# Patient Record
Sex: MALE | Race: WHITE | NOT HISPANIC OR LATINO | Employment: OTHER | URBAN - METROPOLITAN AREA
[De-identification: names, ages, dates, MRNs, and addresses within clinical notes are randomized per-mention and may not be internally consistent; named-entity substitution may affect disease eponyms.]

---

## 2017-06-27 ENCOUNTER — HOSPITAL ENCOUNTER (INPATIENT)
Facility: MEDICAL CENTER | Age: 57
LOS: 1 days | DRG: 247 | End: 2017-06-28
Attending: EMERGENCY MEDICINE | Admitting: INTERNAL MEDICINE
Payer: COMMERCIAL

## 2017-06-27 ENCOUNTER — RESOLUTE PROFESSIONAL BILLING HOSPITAL PROF FEE (OUTPATIENT)
Dept: HOSPITALIST | Facility: MEDICAL CENTER | Age: 57
End: 2017-06-27
Payer: COMMERCIAL

## 2017-06-27 ENCOUNTER — APPOINTMENT (OUTPATIENT)
Dept: RADIOLOGY | Facility: MEDICAL CENTER | Age: 57
DRG: 247 | End: 2017-06-27
Attending: EMERGENCY MEDICINE
Payer: COMMERCIAL

## 2017-06-27 DIAGNOSIS — I21.4 NON-STEMI (NON-ST ELEVATED MYOCARDIAL INFARCTION) (HCC): ICD-10-CM

## 2017-06-27 DIAGNOSIS — I20.0 UNSTABLE ANGINA (HCC): ICD-10-CM

## 2017-06-27 PROBLEM — I10 ESSENTIAL HYPERTENSION: Status: ACTIVE | Noted: 2017-06-27

## 2017-06-27 LAB
ALBUMIN SERPL BCP-MCNC: 4.2 G/DL (ref 3.2–4.9)
ALBUMIN/GLOB SERPL: 1.5 G/DL
ALP SERPL-CCNC: 68 U/L (ref 30–99)
ALT SERPL-CCNC: 19 U/L (ref 2–50)
ANION GAP SERPL CALC-SCNC: 8 MMOL/L (ref 0–11.9)
APTT PPP: 30 SEC (ref 24.7–36)
AST SERPL-CCNC: 18 U/L (ref 12–45)
BASOPHILS # BLD AUTO: 0.4 % (ref 0–1.8)
BASOPHILS # BLD: 0.03 K/UL (ref 0–0.12)
BILIRUB SERPL-MCNC: 0.5 MG/DL (ref 0.1–1.5)
BNP SERPL-MCNC: 38 PG/ML (ref 0–100)
BUN SERPL-MCNC: 23 MG/DL (ref 8–22)
CALCIUM SERPL-MCNC: 9.8 MG/DL (ref 8.5–10.5)
CHLORIDE SERPL-SCNC: 104 MMOL/L (ref 96–112)
CO2 SERPL-SCNC: 25 MMOL/L (ref 20–33)
CREAT SERPL-MCNC: 1.22 MG/DL (ref 0.5–1.4)
EKG IMPRESSION: NORMAL
EOSINOPHIL # BLD AUTO: 0.18 K/UL (ref 0–0.51)
EOSINOPHIL NFR BLD: 2.5 % (ref 0–6.9)
ERYTHROCYTE [DISTWIDTH] IN BLOOD BY AUTOMATED COUNT: 42.1 FL (ref 35.9–50)
GFR SERPL CREATININE-BSD FRML MDRD: >60 ML/MIN/1.73 M 2
GLOBULIN SER CALC-MCNC: 2.8 G/DL (ref 1.9–3.5)
GLUCOSE SERPL-MCNC: 99 MG/DL (ref 65–99)
HCT VFR BLD AUTO: 46.8 % (ref 42–52)
HGB BLD-MCNC: 15.5 G/DL (ref 14–18)
IMM GRANULOCYTES # BLD AUTO: 0.02 K/UL (ref 0–0.11)
IMM GRANULOCYTES NFR BLD AUTO: 0.3 % (ref 0–0.9)
INR PPP: 0.93 (ref 0.87–1.13)
LIPASE SERPL-CCNC: 19 U/L (ref 11–82)
LYMPHOCYTES # BLD AUTO: 1.92 K/UL (ref 1–4.8)
LYMPHOCYTES NFR BLD: 27.1 % (ref 22–41)
MCH RBC QN AUTO: 28.5 PG (ref 27–33)
MCHC RBC AUTO-ENTMCNC: 33.1 G/DL (ref 33.7–35.3)
MCV RBC AUTO: 86.2 FL (ref 81.4–97.8)
MONOCYTES # BLD AUTO: 0.62 K/UL (ref 0–0.85)
MONOCYTES NFR BLD AUTO: 8.8 % (ref 0–13.4)
NEUTROPHILS # BLD AUTO: 4.31 K/UL (ref 1.82–7.42)
NEUTROPHILS NFR BLD: 60.9 % (ref 44–72)
NRBC # BLD AUTO: 0 K/UL
NRBC BLD AUTO-RTO: 0 /100 WBC
PLATELET # BLD AUTO: 226 K/UL (ref 164–446)
PMV BLD AUTO: 9.7 FL (ref 9–12.9)
POTASSIUM SERPL-SCNC: 4.4 MMOL/L (ref 3.6–5.5)
PROT SERPL-MCNC: 7 G/DL (ref 6–8.2)
PROTHROMBIN TIME: 12.7 SEC (ref 12–14.6)
RBC # BLD AUTO: 5.43 M/UL (ref 4.7–6.1)
SODIUM SERPL-SCNC: 137 MMOL/L (ref 135–145)
TROPONIN I SERPL-MCNC: 0.28 NG/ML (ref 0–0.04)
TROPONIN I SERPL-MCNC: 0.28 NG/ML (ref 0–0.04)
WBC # BLD AUTO: 7.1 K/UL (ref 4.8–10.8)

## 2017-06-27 PROCEDURE — 700101 HCHG RX REV CODE 250

## 2017-06-27 PROCEDURE — 4A023N7 MEASUREMENT OF CARDIAC SAMPLING AND PRESSURE, LEFT HEART, PERCUTANEOUS APPROACH: ICD-10-PCS | Performed by: INTERNAL MEDICINE

## 2017-06-27 PROCEDURE — 93571 IV DOP VEL&/PRESS C FLO 1ST: CPT

## 2017-06-27 PROCEDURE — 700102 HCHG RX REV CODE 250 W/ 637 OVERRIDE(OP)

## 2017-06-27 PROCEDURE — 93005 ELECTROCARDIOGRAM TRACING: CPT | Performed by: INTERNAL MEDICINE

## 2017-06-27 PROCEDURE — 93306 TTE W/DOPPLER COMPLETE: CPT | Mod: 26 | Performed by: INTERNAL MEDICINE

## 2017-06-27 PROCEDURE — C1769 GUIDE WIRE: HCPCS

## 2017-06-27 PROCEDURE — 360979 HCHG DIAGNOSTIC CATH

## 2017-06-27 PROCEDURE — 93010 ELECTROCARDIOGRAM REPORT: CPT | Performed by: INTERNAL MEDICINE

## 2017-06-27 PROCEDURE — C1887 CATHETER, GUIDING: HCPCS

## 2017-06-27 PROCEDURE — 700102 HCHG RX REV CODE 250 W/ 637 OVERRIDE(OP): Performed by: INTERNAL MEDICINE

## 2017-06-27 PROCEDURE — 700111 HCHG RX REV CODE 636 W/ 250 OVERRIDE (IP)

## 2017-06-27 PROCEDURE — C9600 PERC DRUG-EL COR STENT SING: HCPCS | Mod: RC

## 2017-06-27 PROCEDURE — 700111 HCHG RX REV CODE 636 W/ 250 OVERRIDE (IP): Performed by: INTERNAL MEDICINE

## 2017-06-27 PROCEDURE — 80053 COMPREHEN METABOLIC PANEL: CPT

## 2017-06-27 PROCEDURE — 71010 DX-CHEST-PORTABLE (1 VIEW): CPT

## 2017-06-27 PROCEDURE — 700105 HCHG RX REV CODE 258: Performed by: INTERNAL MEDICINE

## 2017-06-27 PROCEDURE — 85025 COMPLETE CBC W/AUTO DIFF WBC: CPT

## 2017-06-27 PROCEDURE — 93458 L HRT ARTERY/VENTRICLE ANGIO: CPT

## 2017-06-27 PROCEDURE — 83880 ASSAY OF NATRIURETIC PEPTIDE: CPT

## 2017-06-27 PROCEDURE — 770020 HCHG ROOM/CARE - TELE (206)

## 2017-06-27 PROCEDURE — C1894 INTRO/SHEATH, NON-LASER: HCPCS

## 2017-06-27 PROCEDURE — C9600 PERC DRUG-EL COR STENT SING: HCPCS | Mod: LD

## 2017-06-27 PROCEDURE — A9270 NON-COVERED ITEM OR SERVICE: HCPCS | Performed by: INTERNAL MEDICINE

## 2017-06-27 PROCEDURE — B2151ZZ FLUOROSCOPY OF LEFT HEART USING LOW OSMOLAR CONTRAST: ICD-10-PCS | Performed by: INTERNAL MEDICINE

## 2017-06-27 PROCEDURE — 96375 TX/PRO/DX INJ NEW DRUG ADDON: CPT

## 2017-06-27 PROCEDURE — 96365 THER/PROPH/DIAG IV INF INIT: CPT

## 2017-06-27 PROCEDURE — 700102 HCHG RX REV CODE 250 W/ 637 OVERRIDE(OP): Performed by: EMERGENCY MEDICINE

## 2017-06-27 PROCEDURE — 93005 ELECTROCARDIOGRAM TRACING: CPT

## 2017-06-27 PROCEDURE — 85610 PROTHROMBIN TIME: CPT

## 2017-06-27 PROCEDURE — 700117 HCHG RX CONTRAST REV CODE 255: Performed by: INTERNAL MEDICINE

## 2017-06-27 PROCEDURE — 93005 ELECTROCARDIOGRAM TRACING: CPT | Performed by: EMERGENCY MEDICINE

## 2017-06-27 PROCEDURE — C1874 STENT, COATED/COV W/DEL SYS: HCPCS

## 2017-06-27 PROCEDURE — 99153 MOD SED SAME PHYS/QHP EA: CPT

## 2017-06-27 PROCEDURE — C1725 CATH, TRANSLUMIN NON-LASER: HCPCS

## 2017-06-27 PROCEDURE — 027135Z DILATION OF CORONARY ARTERY, TWO ARTERIES WITH TWO DRUG-ELUTING INTRALUMINAL DEVICES, PERCUTANEOUS APPROACH: ICD-10-PCS | Performed by: INTERNAL MEDICINE

## 2017-06-27 PROCEDURE — 700101 HCHG RX REV CODE 250: Performed by: EMERGENCY MEDICINE

## 2017-06-27 PROCEDURE — 99285 EMERGENCY DEPT VISIT HI MDM: CPT

## 2017-06-27 PROCEDURE — 99152 MOD SED SAME PHYS/QHP 5/>YRS: CPT

## 2017-06-27 PROCEDURE — B2111ZZ FLUOROSCOPY OF MULTIPLE CORONARY ARTERIES USING LOW OSMOLAR CONTRAST: ICD-10-PCS | Performed by: INTERNAL MEDICINE

## 2017-06-27 PROCEDURE — A9270 NON-COVERED ITEM OR SERVICE: HCPCS | Performed by: EMERGENCY MEDICINE

## 2017-06-27 PROCEDURE — 36415 COLL VENOUS BLD VENIPUNCTURE: CPT

## 2017-06-27 PROCEDURE — 99223 1ST HOSP IP/OBS HIGH 75: CPT | Performed by: INTERNAL MEDICINE

## 2017-06-27 PROCEDURE — 84484 ASSAY OF TROPONIN QUANT: CPT

## 2017-06-27 PROCEDURE — A9270 NON-COVERED ITEM OR SERVICE: HCPCS

## 2017-06-27 PROCEDURE — 4A033BC MEASUREMENT OF ARTERIAL PRESSURE, CORONARY, PERCUTANEOUS APPROACH: ICD-10-PCS | Performed by: INTERNAL MEDICINE

## 2017-06-27 PROCEDURE — 83690 ASSAY OF LIPASE: CPT

## 2017-06-27 PROCEDURE — 93306 TTE W/DOPPLER COMPLETE: CPT

## 2017-06-27 PROCEDURE — 304952 HCHG R 2 PADS

## 2017-06-27 PROCEDURE — 85730 THROMBOPLASTIN TIME PARTIAL: CPT

## 2017-06-27 PROCEDURE — 307093 HCHG TR BAND RADIAL

## 2017-06-27 RX ORDER — ACETAMINOPHEN 325 MG/1
650 TABLET ORAL EVERY 6 HOURS PRN
Status: DISCONTINUED | OUTPATIENT
Start: 2017-06-27 | End: 2017-06-28 | Stop reason: HOSPADM

## 2017-06-27 RX ORDER — LISINOPRIL 10 MG/1
5 TABLET ORAL
Status: DISCONTINUED | OUTPATIENT
Start: 2017-06-27 | End: 2017-06-27

## 2017-06-27 RX ORDER — HEPARIN SODIUM,PORCINE 1000/ML
VIAL (ML) INJECTION
Status: COMPLETED
Start: 2017-06-27 | End: 2017-06-27

## 2017-06-27 RX ORDER — ONDANSETRON 2 MG/ML
4 INJECTION INTRAMUSCULAR; INTRAVENOUS EVERY 6 HOURS PRN
Status: DISCONTINUED | OUTPATIENT
Start: 2017-06-27 | End: 2017-06-27

## 2017-06-27 RX ORDER — ASPIRIN 325 MG
325 TABLET ORAL DAILY
Status: DISCONTINUED | OUTPATIENT
Start: 2017-06-28 | End: 2017-06-27

## 2017-06-27 RX ORDER — ONDANSETRON 2 MG/ML
4 INJECTION INTRAMUSCULAR; INTRAVENOUS EVERY 4 HOURS PRN
Status: DISCONTINUED | OUTPATIENT
Start: 2017-06-27 | End: 2017-06-28 | Stop reason: HOSPADM

## 2017-06-27 RX ORDER — ASPIRIN 81 MG/1
324 TABLET, CHEWABLE ORAL DAILY
Status: DISCONTINUED | OUTPATIENT
Start: 2017-06-28 | End: 2017-06-28 | Stop reason: HOSPADM

## 2017-06-27 RX ORDER — PROMETHAZINE HYDROCHLORIDE 25 MG/1
12.5-25 SUPPOSITORY RECTAL EVERY 4 HOURS PRN
Status: DISCONTINUED | OUTPATIENT
Start: 2017-06-27 | End: 2017-06-28 | Stop reason: HOSPADM

## 2017-06-27 RX ORDER — ADENOSINE 3 MG/ML
INJECTION, SOLUTION INTRAVENOUS
Status: COMPLETED
Start: 2017-06-27 | End: 2017-06-27

## 2017-06-27 RX ORDER — POLYETHYLENE GLYCOL 3350 17 G/17G
1 POWDER, FOR SOLUTION ORAL
Status: DISCONTINUED | OUTPATIENT
Start: 2017-06-27 | End: 2017-06-28 | Stop reason: HOSPADM

## 2017-06-27 RX ORDER — ASPIRIN 325 MG
325 TABLET ORAL DAILY
Status: DISCONTINUED | OUTPATIENT
Start: 2017-06-28 | End: 2017-06-28 | Stop reason: HOSPADM

## 2017-06-27 RX ORDER — MIDAZOLAM HYDROCHLORIDE 1 MG/ML
INJECTION INTRAMUSCULAR; INTRAVENOUS
Status: COMPLETED
Start: 2017-06-27 | End: 2017-06-27

## 2017-06-27 RX ORDER — ATORVASTATIN CALCIUM 20 MG/1
40 TABLET, FILM COATED ORAL EVERY EVENING
Status: DISCONTINUED | OUTPATIENT
Start: 2017-06-27 | End: 2017-06-27

## 2017-06-27 RX ORDER — NITROGLYCERIN 0.4 MG/1
0.4 TABLET SUBLINGUAL
Status: DISCONTINUED | OUTPATIENT
Start: 2017-06-27 | End: 2017-06-28 | Stop reason: HOSPADM

## 2017-06-27 RX ORDER — PRASUGREL 10 MG/1
TABLET, FILM COATED ORAL
Status: COMPLETED
Start: 2017-06-27 | End: 2017-06-27

## 2017-06-27 RX ORDER — ASPIRIN 81 MG/1
324 TABLET, CHEWABLE ORAL DAILY
Status: DISCONTINUED | OUTPATIENT
Start: 2017-06-28 | End: 2017-06-27

## 2017-06-27 RX ORDER — LIDOCAINE HYDROCHLORIDE 20 MG/ML
INJECTION, SOLUTION INFILTRATION; PERINEURAL
Status: COMPLETED
Start: 2017-06-27 | End: 2017-06-27

## 2017-06-27 RX ORDER — PROMETHAZINE HYDROCHLORIDE 25 MG/1
12.5-25 TABLET ORAL EVERY 4 HOURS PRN
Status: DISCONTINUED | OUTPATIENT
Start: 2017-06-27 | End: 2017-06-28 | Stop reason: HOSPADM

## 2017-06-27 RX ORDER — VERAPAMIL HYDROCHLORIDE 2.5 MG/ML
INJECTION, SOLUTION INTRAVENOUS
Status: COMPLETED
Start: 2017-06-27 | End: 2017-06-27

## 2017-06-27 RX ORDER — PRASUGREL 10 MG/1
10 TABLET, FILM COATED ORAL DAILY
Status: DISCONTINUED | OUTPATIENT
Start: 2017-06-28 | End: 2017-06-28 | Stop reason: HOSPADM

## 2017-06-27 RX ORDER — LABETALOL HYDROCHLORIDE 5 MG/ML
10-20 INJECTION, SOLUTION INTRAVENOUS EVERY 4 HOURS PRN
Status: DISCONTINUED | OUTPATIENT
Start: 2017-06-27 | End: 2017-06-28 | Stop reason: HOSPADM

## 2017-06-27 RX ORDER — BIVALIRUDIN 250 MG/5ML
INJECTION, POWDER, LYOPHILIZED, FOR SOLUTION INTRAVENOUS
Status: COMPLETED
Start: 2017-06-27 | End: 2017-06-27

## 2017-06-27 RX ORDER — MORPHINE SULFATE 4 MG/ML
2-4 INJECTION, SOLUTION INTRAMUSCULAR; INTRAVENOUS
Status: DISCONTINUED | OUTPATIENT
Start: 2017-06-27 | End: 2017-06-28 | Stop reason: HOSPADM

## 2017-06-27 RX ORDER — LOSARTAN POTASSIUM 50 MG/1
100 TABLET ORAL DAILY
COMMUNITY
End: 2020-05-30

## 2017-06-27 RX ORDER — LOSARTAN POTASSIUM 50 MG/1
50 TABLET ORAL
Status: DISCONTINUED | OUTPATIENT
Start: 2017-06-27 | End: 2017-06-28 | Stop reason: HOSPADM

## 2017-06-27 RX ORDER — AMOXICILLIN 250 MG
2 CAPSULE ORAL 2 TIMES DAILY
Status: DISCONTINUED | OUTPATIENT
Start: 2017-06-27 | End: 2017-06-28 | Stop reason: HOSPADM

## 2017-06-27 RX ORDER — ASPIRIN 300 MG/1
300 SUPPOSITORY RECTAL DAILY
Status: DISCONTINUED | OUTPATIENT
Start: 2017-06-28 | End: 2017-06-28 | Stop reason: HOSPADM

## 2017-06-27 RX ORDER — ASPIRIN 325 MG
325 TABLET ORAL ONCE
Status: COMPLETED | OUTPATIENT
Start: 2017-06-27 | End: 2017-06-27

## 2017-06-27 RX ORDER — BISACODYL 10 MG
10 SUPPOSITORY, RECTAL RECTAL
Status: DISCONTINUED | OUTPATIENT
Start: 2017-06-27 | End: 2017-06-28 | Stop reason: HOSPADM

## 2017-06-27 RX ORDER — ACETAMINOPHEN 325 MG/1
325 TABLET ORAL EVERY 4 HOURS PRN
Status: DISCONTINUED | OUTPATIENT
Start: 2017-06-27 | End: 2017-06-27

## 2017-06-27 RX ORDER — SODIUM CHLORIDE 9 MG/ML
INJECTION, SOLUTION INTRAVENOUS CONTINUOUS
Status: DISPENSED | OUTPATIENT
Start: 2017-06-27 | End: 2017-06-27

## 2017-06-27 RX ORDER — ASPIRIN 300 MG/1
300 SUPPOSITORY RECTAL DAILY
Status: DISCONTINUED | OUTPATIENT
Start: 2017-06-28 | End: 2017-06-27

## 2017-06-27 RX ORDER — NITROGLYCERIN 0.4 MG/1
0.4 TABLET SUBLINGUAL
Status: DISCONTINUED | OUTPATIENT
Start: 2017-06-27 | End: 2017-06-27

## 2017-06-27 RX ORDER — NITROGLYCERIN 20 MG/100ML
INJECTION INTRAVENOUS
Status: COMPLETED
Start: 2017-06-27 | End: 2017-06-27

## 2017-06-27 RX ORDER — ONDANSETRON 4 MG/1
4 TABLET, ORALLY DISINTEGRATING ORAL EVERY 4 HOURS PRN
Status: DISCONTINUED | OUTPATIENT
Start: 2017-06-27 | End: 2017-06-28 | Stop reason: HOSPADM

## 2017-06-27 RX ORDER — NITROGLYCERIN 20 MG/100ML
20 INJECTION INTRAVENOUS CONTINUOUS
Status: DISCONTINUED | OUTPATIENT
Start: 2017-06-27 | End: 2017-06-27

## 2017-06-27 RX ORDER — ATORVASTATIN CALCIUM 40 MG/1
40 TABLET, FILM COATED ORAL
Status: DISCONTINUED | OUTPATIENT
Start: 2017-06-27 | End: 2017-06-28 | Stop reason: HOSPADM

## 2017-06-27 RX ADMIN — NITROGLYCERIN 10 ML: 20 INJECTION INTRAVENOUS at 11:45

## 2017-06-27 RX ADMIN — NITROGLYCERIN 20 MCG/MIN: 20 INJECTION INTRAVENOUS at 10:48

## 2017-06-27 RX ADMIN — METOPROLOL TARTRATE 25 MG: 25 TABLET, FILM COATED ORAL at 21:12

## 2017-06-27 RX ADMIN — VERAPAMIL HYDROCHLORIDE 5 MG: 2.5 INJECTION, SOLUTION INTRAVENOUS at 11:52

## 2017-06-27 RX ADMIN — ASPIRIN 325 MG: 325 TABLET, COATED ORAL at 10:26

## 2017-06-27 RX ADMIN — BIVALIRUDIN 250 MG: 250 INJECTION, POWDER, LYOPHILIZED, FOR SOLUTION INTRAVENOUS at 11:53

## 2017-06-27 RX ADMIN — FENTANYL CITRATE 100 MCG: 50 INJECTION, SOLUTION INTRAMUSCULAR; INTRAVENOUS at 11:53

## 2017-06-27 RX ADMIN — Medication 60 MG: at 12:17

## 2017-06-27 RX ADMIN — MIDAZOLAM 2 MG: 1 INJECTION INTRAMUSCULAR; INTRAVENOUS at 11:52

## 2017-06-27 RX ADMIN — MIDAZOLAM 1 MG: 1 INJECTION INTRAMUSCULAR; INTRAVENOUS at 12:16

## 2017-06-27 RX ADMIN — STANDARDIZED SENNA CONCENTRATE AND DOCUSATE SODIUM 1 TABLET: 8.6; 5 TABLET, FILM COATED ORAL at 21:14

## 2017-06-27 RX ADMIN — HEPARIN SODIUM 2000 UNITS: 200 INJECTION, SOLUTION INTRAVENOUS at 11:45

## 2017-06-27 RX ADMIN — ADENOSINE 90 MG: 3 INJECTION, SOLUTION INTRAVENOUS at 11:52

## 2017-06-27 RX ADMIN — HEPARIN SODIUM: 1000 INJECTION, SOLUTION INTRAVENOUS; SUBCUTANEOUS at 11:45

## 2017-06-27 RX ADMIN — BIVALIRUDIN 250 MG: 250 INJECTION, POWDER, LYOPHILIZED, FOR SOLUTION INTRAVENOUS at 12:05

## 2017-06-27 RX ADMIN — ACETAMINOPHEN 650 MG: 325 TABLET, FILM COATED ORAL at 14:46

## 2017-06-27 RX ADMIN — BIVALIRUDIN 0.2 MG/KG/HR: 250 INJECTION, POWDER, LYOPHILIZED, FOR SOLUTION INTRAVENOUS at 12:20

## 2017-06-27 RX ADMIN — ATORVASTATIN CALCIUM 40 MG: 40 TABLET, FILM COATED ORAL at 21:12

## 2017-06-27 RX ADMIN — SODIUM CHLORIDE: 9 INJECTION, SOLUTION INTRAVENOUS at 14:10

## 2017-06-27 RX ADMIN — NITROGLYCERIN 1 INCH: 20 OINTMENT TOPICAL at 10:28

## 2017-06-27 RX ADMIN — LIDOCAINE HYDROCHLORIDE: 20 INJECTION, SOLUTION INFILTRATION; PERINEURAL at 11:45

## 2017-06-27 RX ADMIN — METOPROLOL TARTRATE 5 MG: 5 INJECTION INTRAVENOUS at 10:26

## 2017-06-27 RX ADMIN — IOHEXOL 262 ML: 350 INJECTION, SOLUTION INTRAVENOUS at 12:23

## 2017-06-27 ASSESSMENT — PAIN SCALES - GENERAL
PAINLEVEL_OUTOF10: 3
PAINLEVEL_OUTOF10: 0
PAINLEVEL_OUTOF10: 4
PAINLEVEL_OUTOF10: 0
PAINLEVEL_OUTOF10: 0

## 2017-06-27 ASSESSMENT — ENCOUNTER SYMPTOMS
PSYCHIATRIC NEGATIVE: 1
NEUROLOGICAL NEGATIVE: 1
PALPITATIONS: 0
VOMITING: 0
BRUISES/BLEEDS EASILY: 0
NAUSEA: 0
SHORTNESS OF BREATH: 0
WEAKNESS: 0
CONSTITUTIONAL NEGATIVE: 1
EYES NEGATIVE: 1

## 2017-06-27 ASSESSMENT — LIFESTYLE VARIABLES
EVER HAD A DRINK FIRST THING IN THE MORNING TO STEADY YOUR NERVES TO GET RID OF A HANGOVER: NO
TOTAL SCORE: 0
HOW MANY TIMES IN THE PAST YEAR HAVE YOU HAD 5 OR MORE DRINKS IN A DAY: 0
EVER FELT BAD OR GUILTY ABOUT YOUR DRINKING: NO
ALCOHOL_USE: YES
TOTAL SCORE: 0
TOTAL SCORE: 0
CONSUMPTION TOTAL: NEGATIVE
AVERAGE NUMBER OF DAYS PER WEEK YOU HAVE A DRINK CONTAINING ALCOHOL: 1
EVER_SMOKED: NEVER
HAVE PEOPLE ANNOYED YOU BY CRITICIZING YOUR DRINKING: NO
HAVE YOU EVER FELT YOU SHOULD CUT DOWN ON YOUR DRINKING: NO
ON A TYPICAL DAY WHEN YOU DRINK ALCOHOL HOW MANY DRINKS DO YOU HAVE: 1

## 2017-06-27 NOTE — PROGRESS NOTES
Pt arrives from cath lab. Report received from cath lab RN. Pt is A+O. No c/o and in good spirits. Tele box placed.

## 2017-06-27 NOTE — ED NOTES
Pt to triage, c/o jaw / neck pain w/ + associated SOB, + diaphoretic at times x 3 days , also c/o rt lower dental pain , EKG done in triage

## 2017-06-27 NOTE — ED PROVIDER NOTES
"ED Provider Note    Scribed for Moni Francis M.D. by Kinza Christina. 6/27/2017  9:58 AM    Primary care provider: No primary care provider on file.  Means of arrival: Walk-In  History obtained from: Patient  History limited by: None    CHIEF COMPLAINT  Chief Complaint   Patient presents with   • Jaw Pain   • Shortness of Breath       HPI  Ivan Ledezma is a 56 y.o. male with a history of hypertension who presents to the Emergency Department complaining of jaw pain and shortness of breath onset Cristobal night. Associated symptoms include sweats, nausea and loss of sleep and pain between his shoulder blades. He states his pain starts at his chest radiating to his jaw. Patient's pain is exacerbated with exertion and alleviated with laying down. Patient is not experiencing pain currently. Patient's wife notes for the past couple of night he has been unable to sleep or \"get comfortable.\" Additionally, he reports he stopped taking his Lasartin 3-4 months ago due to the amount of stress he was in. Denies seeing a cardiologist in the past but confirms past stress test in September. Confirms family history of cardiac problems. Confirms neck surgery years ago. Denies alcohol, drug or smoking use. Denies abdominal pain.     REVIEW OF SYSTEMS  HEENT:  No ear pain, congestion, or sore throat   EYES: no discharge, redness, or vision changes  CARDIAC: no chest pain, no palpitations    PULMONARY: Positive for shortness of breath. no dyspnea, cough, or congestion   GI: Positive for nausea. no vomiting, diarrhea, or abdominal pain   : no dysuria, back pain, or hematuria   Neuro: no weakness, numbness, aphasia, or headache  Musculoskeletal: Positive for shoulder blade pain. Positive for jaw pain. no swelling, deformity, or joint swelling  Endocrine: no fevers, sweating, or weight loss   SKIN: no rash, erythema, or contusions     See history of present illness. All other systems are negative    PAST MEDICAL HISTORY   has a past " medical history of Hypertension.    SURGICAL HISTORY  patient denies any surgical history    SOCIAL HISTORY  Social History   Substance Use Topics   • Smoking status: Never Smoker    • Smokeless tobacco: None   • Alcohol Use: No      History   Drug Use No       FAMILY HISTORY  History reviewed. No pertinent family history.    CURRENT MEDICATIONS  Home Medications     **Home medications have not yet been reviewed for this encounter**          ALLERGIES  Allergies   Allergen Reactions   • Motrin [Advil Cold-Sinus]        PHYSICAL EXAM  VITAL SIGNS: /118 mmHg  Pulse 77  Temp(Src) 37.2 °C (99 °F)  Resp 16  Ht 1.829 m (6')  Wt 120.203 kg (265 lb)  BMI 35.93 kg/m2  SpO2 98%    Constitutional: Obese. Well nourished, No acute distress, Non-toxic appearance.   HEENT: Normocephalic, Atraumatic,  external ears normal, pharynx pink,  Mucous  Membranes moist, No rhinorrhea or mucosal edema  Eyes: PERRL, EOMI, Conjunctiva normal, No discharge.   Neck: Normal range of motion, No tenderness, Supple, No stridor.   Lymphatic: No lymphadenopathy    Cardiovascular: Regular Rate and Rhythm, No murmurs,  rubs, or gallops.   Thorax & Lungs: Lungs clear to auscultation bilaterally, No respiratory distress, No wheezes, rhales or rhonchi, No chest wall tenderness.   Abdomen: Bowel sounds normal, Soft, non tender, non distended,  No pulsatile masses., no rebound guarding or peritoneal signs.   Skin: No diaphoresis. Warm, Dry, No erythema, No rash,   Back:  No CVA tenderness,  No spinal tenderness, bony crepitance, step offs, or instability.   Neurologic: Speaking in full sentences. Alert & oriented x 3, Normal motor function, Normal sensory function, No focal deficits noted. Normal reflexes. Normal Cranial Nerves.  Extremities: Equal, intact distal pulses, No cyanosis, clubbing or edema,  No tenderness.   Musculoskeletal: Good range of motion in all major joints. No tenderness to palpation or major deformities noted.      DIAGNOSTIC STUDIES / PROCEDURES    LABS  Results for orders placed or performed during the hospital encounter of 06/27/17   CBC with Differential   Result Value Ref Range    WBC 7.1 4.8 - 10.8 K/uL    RBC 5.43 4.70 - 6.10 M/uL    Hemoglobin 15.5 14.0 - 18.0 g/dL    Hematocrit 46.8 42.0 - 52.0 %    MCV 86.2 81.4 - 97.8 fL    MCH 28.5 27.0 - 33.0 pg    MCHC 33.1 (L) 33.7 - 35.3 g/dL    RDW 42.1 35.9 - 50.0 fL    Platelet Count 226 164 - 446 K/uL    MPV 9.7 9.0 - 12.9 fL    Neutrophils-Polys 60.90 44.00 - 72.00 %    Lymphocytes 27.10 22.00 - 41.00 %    Monocytes 8.80 0.00 - 13.40 %    Eosinophils 2.50 0.00 - 6.90 %    Basophils 0.40 0.00 - 1.80 %    Immature Granulocytes 0.30 0.00 - 0.90 %    Nucleated RBC 0.00 /100 WBC    Neutrophils (Absolute) 4.31 1.82 - 7.42 K/uL    Lymphs (Absolute) 1.92 1.00 - 4.80 K/uL    Monos (Absolute) 0.62 0.00 - 0.85 K/uL    Eos (Absolute) 0.18 0.00 - 0.51 K/uL    Baso (Absolute) 0.03 0.00 - 0.12 K/uL    Immature Granulocytes (abs) 0.02 0.00 - 0.11 K/uL    NRBC (Absolute) 0.00 K/uL   Complete Metabolic Panel (CMP)   Result Value Ref Range    Sodium 137 135 - 145 mmol/L    Potassium 4.4 3.6 - 5.5 mmol/L    Chloride 104 96 - 112 mmol/L    Co2 25 20 - 33 mmol/L    Anion Gap 8.0 0.0 - 11.9    Glucose 99 65 - 99 mg/dL    Bun 23 (H) 8 - 22 mg/dL    Creatinine 1.22 0.50 - 1.40 mg/dL    Calcium 9.8 8.5 - 10.5 mg/dL    AST(SGOT) 18 12 - 45 U/L    ALT(SGPT) 19 2 - 50 U/L    Alkaline Phosphatase 68 30 - 99 U/L    Total Bilirubin 0.5 0.1 - 1.5 mg/dL    Albumin 4.2 3.2 - 4.9 g/dL    Total Protein 7.0 6.0 - 8.2 g/dL    Globulin 2.8 1.9 - 3.5 g/dL    A-G Ratio 1.5 g/dL   Btype Natriuretic Peptide (BNP)   Result Value Ref Range    B Natriuretic Peptide 38 0 - 100 pg/mL   Prothrombin Time (PT/INR)   Result Value Ref Range    PT 12.7 12.0 - 14.6 sec    INR 0.93 0.87 - 1.13   APTT   Result Value Ref Range    APTT 30.0 24.7 - 36.0 sec   Lipase   Result Value Ref Range    Lipase 19 11 - 82 U/L   Troponin  STAT   Result Value Ref Range    Troponin I 0.28 (H) 0.00 - 0.04 ng/mL   ESTIMATED GFR   Result Value Ref Range    GFR If African American >60 >60 mL/min/1.73 m 2    GFR If Non African American >60 >60 mL/min/1.73 m 2   EKG (NOW)   Result Value Ref Range    Report       [facility] Emergency Dept.    Test Date:  2017  Pt Name:    MARY GIRON                 Department: ER  MRN:        [id number]                      Room:  Gender:     M                            Technician: [id number]  :        1960                   Requested By:RADHA TRIAGE PROTOCOL  Order #:    [id number]                    Reading MD:    Measurements  Intervals                                Axis  Rate:       71                           P:          31  OH:         180                          QRS:        23  QRSD:       94                           T:          27  QT:         380  QTc:        413    Interpretive Statements  SINUS RHYTHM  No previous ECG available for comparison     EKG (ER)   Result Value Ref Range    Report       [facility] Emergency Dept.    Test Date:  2017  Pt Name:    MARY GIRON                 Department: ER  MRN:        [id number]                      Room:        14  Gender:     M                            Technician: [id number]  :        1960                   Requested By:LULÚ OROPEZA  Order #:    [id number]                    Reading MD:    Measurements  Intervals                                Axis  Rate:       69                           P:          28  OH:         196                          QRS:        7  QRSD:       88                           T:          1  QT:         388  QTc:        416    Interpretive Statements  SINUS RHYTHM  Compared to ECG 2017 08:55:42  No significant changes     EKG - STAT Once   Result Value Ref Range    Report       [facility] Emergency Dept.    Test Date:  2017  Pt Name:    MARY GIRON                  Department: ER  MRN:        [id number]                      Room:       [facility]  Gender:     M                            Technician: [id number]  :        1960                   Requested By:ALEC NUNEZ  Order #:    [id number]                    Reading MD:    Measurements  Intervals                                Axis  Rate:       72                           P:          24  WY:         196                          QRS:        6  QRSD:       88                           T:          -12  QT:         380  QTc:        416    Interpretive Statements  SINUS RHYTHM  BORDERLINE T ABNORMALITIES, INFERIOR LEADS  BASELINE WANDER IN LEAD(S) V2  Compared to ECG 2017 08:55:42  T-wave abnormality now present       All labs reviewed by me.    EKG obtained by me at 9:59 AM  12 lead EKG; Interpreted by emergency department physician    Rhythm: Normal Sinus Rhythm   Rate: 71  Axis: Normal  R Wave: Normal R wave progression  Normal ST-T segments  ST Segments: No ST segment elevation   T waves: Normal  Q waves: None    Clinical Impression: No acute changes    New EKG obtained by me at 10:24 AM  12 lead EKG; Interpreted by emergency department physician    Rhythm: Normal Sinus Rhythm   Rate: 69  Axis: Normal  R Wave: Normal R wave progression  Normal ST-T segments  ST Segments: No ST segment elevation   T waves: Inferior T wave inversions and flattening in AVF.  Q waves: None    Clinical Impression: No acute changes      RADIOLOGY  DX-CHEST-PORTABLE (1 VIEW)   Final Result      No acute cardiopulmonary process is seen.      Echocardiogram Comp W/O Cont    (Results Pending)     The radiologist's interpretation of all radiological studies have been reviewed by me.    COURSE & MEDICAL DECISION MAKING  Nursing notes, VS, PMSFHx reviewed in chart.    9:58 AM - Patient seen and examined at bedside. Patient will be treated with  mg, Lopressor 5 mg. Ordered DX-chest, CBC with differential, CMP, BNP, PT/INR, APTT,  lipase, troponin STAT, EKG to evaluate his symptoms. The differential diagnoses include but are not limited to: hypertensive urgency, unstable angina, cardiac chest pain, GERD less likely.     10:17 AM - He will be given aspirin and lopressor for his symptoms. Informed patient of the treatment care plan which is to admit for heart problems.     10:22 AM - Paged Dr. Kern (Cardiology) to discuss the patient's case.     10:24 AM - Patient was informed of his elevated troponin level. Gave Nitro.     10:28 AM - Consulted with Dr. Kern (Cardiology) who agrees to see the patient.     10:34 AM - Patient's PCP is with [facility] in [place].     10:35 AM - Paged [facility] to admit. Dr Kern will take pt to cath lab immediately and started nitroglycerin drip    10:45am [facility] capped. hospitalist Dr Friedman accepts her for admission    Critical Care  Due to the real possibility of a deterioration of this patient's condition required the highest level of my preparedness for sudden emergent intervention. I provided critical care services which included medication orders, frequent reevaluations of the patient's condition and response to treatment, ordering and reviewing test results and discussing the case with various consultants. The critical care time associated with the care of the patient was 40 minutes. Review chart for interventions. This time is exclusive of any other billable procedures.      DISPOSITION:  Patient will be admitted to Dr. Chicas (hospitalist) in guarded condition.      FINAL IMPRESSION  Performed 40 minutes of Critical Care Time  1. Non-STEMI (non-ST elevated myocardial infarction) (CMS-HCC)    2. Unstable angina (CMS-[facility])          Kinza JACKSON (Boom), am scribing for, and in the presence of, Moni Francis M.D..    Electronically signed by: Kinza Christina (Boom), 6/27/2017    Moni JACKSON M.D. personally performed the services described in this documentation, as scribed by Kinza Christina in my  presence, and it is both accurate and complete.    The note accurately reflects work and decisions made by me.  Moni Francis  6/27/2017  1:12 PM

## 2017-06-27 NOTE — IP AVS SNAPSHOT
" <p align=\"LEFT\"><IMG SRC=\"//[email or web address]\" alt=\"Image\" WIDTH=\"50%\" HEIGHT=\"200\" BORDER=\"\"></p>                   Name:Ivan Ledezma  Medical Record Number:[id number]  CSN: [id number]    YOB: 1960   Age: 56 y.o.  Sex: male  HT:1.829 m (6') WT: 121 kg (266 lb 12.1 oz)          Admit Date: 6/27/2017     Discharge Date:   Today's Date: 6/28/2017  Attending Doctor:  Sera Grijalva M.D.                  Allergies:  Motrin          Follow-up Information     1. Please follow up.    Why:  FOLLOW UP WITH YOUR PRIMARY CARE MD IN [facility]        2. Please follow up.    Why:  PLEASE ESTABLISH WITH A CARDIOLOGIST IN [place] OR [place] AND FOLLOW UP PER THEIR RECOMMENDATION.         Medication List      Take these Medications        Instructions    aspirin EC 81 MG Tbec   Commonly known as:  ECOTRIN    Take 1 Tab by mouth every day.   Dose:  81 mg       atorvastatin 40 MG Tabs   Commonly known as:  LIPITOR    Take 1 Tab by mouth every bedtime.   Dose:  40 mg       losartan 50 MG Tabs   Commonly known as:  COZAAR    Take 100 mg by mouth every day.   Dose:  100 mg       metoprolol 25 MG Tabs   Commonly known as:  LOPRESSOR    Take 1 Tab by mouth 2 times a day.   Dose:  25 mg       prasugrel 10 MG Tabs   Commonly known as:  EFFIENT    Take 1 Tab by mouth every day.   Dose:  10 mg         "

## 2017-06-27 NOTE — H&P
ID: the patient is a 56 year old male with no primary care provider as he recently moved to the area from [place]    CC: chest pain and diaphoresis with walking or any activity     HPI: The patient has history of hypertension that has been well controlled on medication. He has no history of heart attacks or high cholesterol and does not smoke. His father did have a heart attack at age 62 and mother had an arrhythmia and required pacemaker placement. The patient had pain in his mid chest that radiated into his neck 3 days ago when he was trimming the hedges.  He was sweating with this pain. He stopped his activity as he thought he strained a muscle and his pain resolved with rest. The last two days the pain in his neck and chest comes back even with walking and resolves with rest so he comes to the hospital to be evaluated. He denies any nausea, vomiting, shortness of breath, cough or swelling in his legs.    ROS: ten point review of systems is reviewed and otherwise negative.    PMH: essential hypertension.    MEDICATIONS: Losartan 50mg daily.    DRUG ALLERGIES: None    SHx: patient denies any tobacco use now or in the past, he drinks 2 alcoholic drinks per month and lives with his wife.    FHx: mother had a pacemaker and heart arrhythmia and father had coronary artery disease and a myocardial infarction.    Physical exam: Temperature 99, blood pressure 186/118, heart rate 69, respiratory rate 16, oxygen saturation 95% on room air  GENERAL: the patient is sitting comfortably in no apparent distress.  HEENT: head is atraumatic, sclerae clear, conjunctivae pink, mucous membranes moist  NECK; supple, no jugular venous distention, no anterior or posterior cervical lymphadenopathy, no supraclavicular lymphadenopathy, trachea midline  SKIN: pink and warm to touch, capillary refill is brisk, no rash or bruising  CV: heart is regular rate, regular rhythm, point of maximal impulse nondisplaced, no murmur, radial pulses 2+  bilaterally  LUNGS: clear to auscultation bilaterally, no wheezes, no rhonchi, chest is symmetric with respiration  ABDOMEN: normal bowel sounds, no palpable hepatosplenomegaly, non distended, nontender on palpation  EXTREMITIES: no clubbing, cyanosis or edema  NEURO: patient is alert and oriented x3, no tremor, he is moving all 4 extremities equally    LABS: sodium 137, potassium 4.4, WBC 7.1, hgb 15.5, troponin 0.28    RADIOLOGY: chest xray reviewed by myself is clear  Electrocardiogram shows sinus rhythm with no ST segment deviation or t-wave changes    ASSESSMENT/PLAN:  1. Unstable angina with concern for non ST elevation myocardial infarction. The patient is going for emergent cardiac catheterization with cardiology acutely. I will order for aspirin, metoprolol, oxygen and nitroglycerin as needed. Depending on the results of the catheterization he may need cardiothoracic surgery consultation or other medication if a stent is placed. He will be monitored on telemetry and I will order for an echocardiogram and serial troponin enzymes.  I will start statin therapy and check a lipid panel.    2. Uncontrolled essential hypertension. I will order for metoprolol and lisinopril and monitor blood pressures.    The patient is a FULL CODE. He will need greater than 2 midnight stay for treatment and work up of his acute cardiac ischemia.

## 2017-06-27 NOTE — PROGRESS NOTES
Attempting to take down TR band. 2 cc's removed from band, small oozing noted. Air returned to band. R pulse +, wave form WDL on . Will continue to monitor.

## 2017-06-27 NOTE — IP AVS SNAPSHOT
Home Care Instructions                                                                                                                  Name:Ivan Ledezma  Medical Record Number:[id number]  CSN: [id number]    YOB: 1960   Age: 56 y.o.  Sex: male  HT:1.829 m (6') WT: 121 kg (266 lb 12.1 oz)          Admit Date: 6/27/2017     Discharge Date:   Today's Date: 6/28/2017  Attending Doctor:  Sera Grijalva M.D.                  Allergies:  Motrin            Discharge Instructions       Discharge Instructions    Discharged to home by car with relative. Discharged via walking, hospital escort: declines  Special equipment needed: Not Applicable    Be sure to schedule a follow-up appointment with your primary care doctor or any specialists as instructed.     Discharge Plan:   Diet Plan: Discussed - cardiac diet.  Activity Level: Discussed - activity as tolerated  Confirmed Follow up Appointment: Patient to Call and Schedule Appointment - Follow up with your primary care MD in [facility]. Follow up with cardiology as directed by a cardiologist - PLEASE ESTABLISH WITH A CARDIOLOGIST IN [place] OR [facility]  Confirmed Symptoms Management: Discussed  Medication Reconciliation Updated: Yes  Influenza Vaccine Indication: Patient Refuses    I understand that a diet low in cholesterol, fat, and sodium is recommended for good health. Unless I have been given specific instructions below for another diet, I accept this instruction as my diet prescription.       Special Instructions: Diagnosis:  Acute Coronary Syndrome (ACS) is a diagnosis that encompasses cardiac-related chest pain and heart attack. ACS occurs when the blood flow to the heart muscle is severely reduced or cut off completely due to a slow process called atherosclerosis.  Atherosclerosis is a disease in which the coronary arteries become narrow from a buildup of fat, cholesterol, and other substances that combine to form plaque. If the plaque breaks, a  blood clot will form and block the blood flow to the heart muscle. This lack of blood flow can cause damage or death to the heart muscle which is called a heart attack or Myocardial Infarction (MI). There are two different types of MIs:  ST Elevation Myocardial Infarction or STEMI (the most severe type of heart attack) and Non-ST Elevation Myocardial Infarction or NSTEMI.    Treatment Plan:  · Cardiac Diet  - Low fat, low salt, low cholesterol   · Cardiac Rehab  - Your doctor has ordered you a referral to [facility].  Call [phone] to schedule an appointment.  · Attend my follow-up appointment with my Cardiologist.  · Take my medications as prescribed by my doctor  · Exercise daily  · Lower my bad cholesterol and raise my good cholesterol    Medications:  Certain medications are used to treat ACS.  Remember to always take medications as prescribed and never stop talking medications unless told by your doctor.    You have been prescribed the following medicatons:    Aspirin - Aspirin is used as a blood thinning medication and you will require this medication indefinitely.  Anti-platelet/blood thinner - Your Anti-platelet/Blood thinning medication is called PRASUGREL, and is used in combination with aspirin to prevent clots from forming in your heart and/or around your stent.  Your doctor will determine how long you need to be on this medicine.  Beta-Blocker - Beta-Blocker METOPROLOL  is used to lower blood pressure and heart rate, and/or helps your heart heal after a heart attack.  Statin - Statin ATORVASTATIN is used to lower cholesterol.  Angiotensin Receptor Blocker (ARB) - Angiotensin Receptor Blocker LOSARTAN is used to lower blood pressure and treat heart failure.    Radial Catherization Discharge Instructions      · Do not subject hand/arm to any forceful movements for 24 hours    i.e. supporting weight when rising from the chair or bed.   · Do not drive a car for 24 hours  · You may remove the dressing  tomorrow  · You may shower on the day following your procedure.  Do not take a tub bath or submerge the puncture site in water for 3 days following the procedure.  · No Lifting more than 3-5 pounds with affected wrist for 5 days  · Follow up with cardiology per direction of cardiologist  · Increase fluids for 2 days post procedure.  · Continue all previous medications unless otherwise instructed.    If bleeding should occur following discharge:  · Sit down and apply firm pressure to site with your fingers for 10 minutes  · If the bleeding stops, continue to sit quietly, keeping your wrist straight for 2 hours.  Notify physician as soon as possible [facility] CARDIOLOGY   · If bleeding does not stop after 10 minutes, or if there is a large amount of bleeding or spurting, call 911 immediately.  Do not drive yourself to the hospital.  ·   · Is patient discharged on Warfarin / Coumadin?   No     · Is patient Post Blood Transfusion?  No    Depression / Suicide Risk    As you are discharged from this [facility] the [facility] facility, it is important to learn how to keep safe from harming yourself.    Recognize the warning signs:  · Abrupt changes in personality, positive or negative- including increase in energy   · Giving away possessions  · Change in eating patterns- significant weight changes-  positive or negative  · Change in sleeping patterns- unable to sleep or sleeping all the time   · Unwillingness or inability to communicate  · Depression  · Unusual sadness, discouragement and loneliness  · Talk of wanting to die  · Neglect of personal appearance   · Rebelliousness- reckless behavior  · Withdrawal from people/activities they love  · Confusion- inability to concentrate     If you or a loved one observes any of these behaviors or has concerns about self-harm, here's what you can do:  · Talk about it- your feelings and reasons for harming yourself  · Remove any means that you might use to hurt yourself (examples:  pills, rope, extension cords, firearm)  · Get professional help from the community (Mental Health, Substance Abuse, psychological counseling)  · Do not be alone:Call your Safe Contact- someone whom you trust who will be there for you.  · Call your local CRISIS HOTLINE 715-9478 or 699-659-7971  · Call your local Children's Mobile Crisis Response Team [place] (641) 254-2923 or www.Vidmaker  · Call the toll free National Suicide Prevention Hotlines   · National Suicide Prevention Lifeline 419-425-MJDY (3908)  · National Hope Line Network 800-SUICIDE (505-2725)        Follow-up Information     1. Please follow up.    Why:  FOLLOW UP WITH YOUR PRIMARY CARE MD IN [facility]        2. Please follow up.    Why:  PLEASE ESTABLISH WITH A CARDIOLOGIST IN [place] OR [place] AND FOLLOW UP PER THEIR RECOMMENDATION.         Discharge Medication Instructions:    Below are the medications your physician expects you to take upon discharge:    Review all your home medications and newly ordered medications with your doctor and/or pharmacist. Follow medication instructions as directed by your doctor and/or pharmacist.    Please keep your medication list with you and share with your physician.               Medication List      START taking these medications        Instructions    Morning Afternoon Evening Bedtime    atorvastatin 40 MG Tabs   Last time this was given:  40 mg on 6/27/2017  9:12 PM   Commonly known as:  LIPITOR        Take 1 Tab by mouth every bedtime.   Dose:  40 mg                        metoprolol 25 MG Tabs   Last time this was given:  25 mg on 6/28/2017  6:14 AM   Commonly known as:  LOPRESSOR        Take 1 Tab by mouth 2 times a day.   Dose:  25 mg                        prasugrel 10 MG Tabs   Last time this was given:  10 mg on 6/28/2017  7:32 AM   Commonly known as:  EFFIENT        Take 1 Tab by mouth every day.   Dose:  10 mg                          CONTINUE taking these medications         Instructions    Morning Afternoon Evening Bedtime    aspirin EC 81 MG Tbec   Commonly known as:  ECOTRIN        Take 1 Tab by mouth every day.   Dose:  81 mg                        losartan 50 MG Tabs   Last time this was given:  50 mg on 6/28/2017  9:53 AM   Commonly known as:  COZAAR        Take 100 mg by mouth every day.   Dose:  100 mg                             Where to Get Your Medications      Information about where to get these medications is not yet available     ! Ask your nurse or doctor about these medications    - atorvastatin 40 MG Tabs  - metoprolol 25 MG Tabs  - prasugrel 10 MG Tabs            Orders for after discharge     REFERRAL TO INTENSIVE CARDIAC REHAB/CARDIAC REHAB    Complete by:  As directed    Qualifying Diagnosis for Cardiac Rehab:    -Myocardial Infarction  -Old Myocardial Infarction  -Coronary Artery Bypass Surgery  -Stable Angina Pectoris  -PTCA Status with or without Stents  -Heart Valve Replaced by other means  -Heart Transplant   -Aneurysm Repair    Provider Exercise Prescription for Treatment Plan:  -Outpatient Cardiac Rehab (CR)/Intensive Cardiac Rehab (ICR), duration based on patient progress 1-3 times per week up to a total of 36/72 sessions over a period of up to 18/36 weeks    -Progressive interval exercise training for 20-45 minutes, 1-3 times per week in Cardiac Rehab utilizing Treadmill, Elliptical, Stationary Cycling, Arm Ergometer, other conditioning activities and appropriate home program supplement    -Light resistance training 2-4 weeks post PTCA, 2-4 weeks post MI, or 4-6 weeks post CABG, 4-6 weeks post aneurysm repair (20 # max)    -% TARGET HEART RATE OR MET’s:        RPE of 11-16 on a scale of 6-20       GXT Data:  40% - 80% exercise capacity using %HR max       HR below ischemic threshold (if determined, HR restriction)    -Education to promote an active healthy lifestyle and reduction of personal health risk factors    -Follow [facility] Policies and Procedures for  Phase II CR/ICR             Instructions           Diet / Nutrition:    Follow any diet instructions given to you by your doctor or the dietician, including how much salt (sodium) you are allowed each day.    If you are overweight, talk to your doctor about a weight reduction plan.    Activity:    Remain physically active following your doctor's instructions about exercise and activity.    Rest often.     Any time you become even a little tired or short of breath, SIT DOWN and rest.    Worsening Symptoms:    Report any of the following signs and symptoms to the doctor's office immediately:    *Pain of jaw, arm, or neck  *Chest pain not relieved by medication                               *Dizziness or loss of consciousness  *Difficulty breathing even when at rest   *More tired than usual                                       *Bleeding drainage or swelling of surgical site  *Swelling of feet, ankles, legs or stomach                 *Fever (>100ºF)  *Pink or blood tinged sputum  *Weight gain (3lbs/day or 5lbs /week)           *Shock from internal defibrillator (if applicable)  *Palpitations or irregular heartbeats                *Cool and/or numb extremities    Stroke Awareness    Common Risk Factors for Stroke include:    Age  Atrial Fibrillation  Carotid Artery Stenosis  Diabetes Mellitus  Excessive alcohol consumption  High blood pressure  Overweight   Physical inactivity  Smoking    Warning signs and symptoms of a stroke include:    *Sudden numbness or weakness of the face, arm or leg (especially on one side of the body).  *Sudden confusion, trouble speaking or understanding.  *Sudden trouble seeing in one or both eyes.  *Sudden trouble walking, dizziness, loss of balance or coordination.Sudden severe headache with no known cause.    It is very important to get treatment quickly when a stroke occurs. If you experience any of the above warning signs, call 911 immediately.                   Disclaimer         Quit  Smoking / Tobacco Use:    I understand the use of any tobacco products increases my chance of suffering from future heart disease or stroke and could cause other illnesses which may shorten my life. Quitting the use of tobacco products is the single most important thing I can do to improve my health. For further information on smoking / tobacco cessation call a Toll Free Quit Line at 1-828.259.6033 (*National Cancer [place]) or 1-566.968.3619 (American Lung Association) or you can access the web based program at www.lungusa.org.    [place] Tobacco Users Help Line:  (392) 369-9732       Toll Free: 1-226.301.7212  Quit Tobacco Program [facility] (669)666-1216    Crisis Hotline:    [place] Crisis Hotline:  1-015-RHVLWOK or 1-643.872.7407    [place] Crisis Hotline:    1-770.328.2472 or 083-574-3105    Discharge Survey:   Thank you for choosing [facility]. We hope we did everything we could to make your hospital stay a pleasant one. You may be receiving a phone survey and we would appreciate your time and participation in answering the questions. Your input is very valuable to us in our efforts to improve our service to our patients and their families.        My signature on this form indicates that:    1. I have reviewed and understand the above information.  2. My questions regarding this information have been answered to my satisfaction.  3. I have formulated a plan with my discharge nurse to obtain my prescribed medications for home.                  Disclaimer         __________________________________                     __________       ________                       Patient Signature                                                 Date                    Time

## 2017-06-27 NOTE — IP AVS SNAPSHOT
6/28/2017    Ivan Ledezma  [address] [address]    Dear Ivan:    [facility] wants to ensure your discharge home is safe and you or your loved ones have had all of your questions answered regarding your care after you leave the hospital.    Below is a list of resources and contact information should you have any questions regarding your hospital stay, follow-up instructions, or active medical symptoms.    Questions or Concerns Regarding… Contact   Medical Questions Related to Your Discharge  (7 days a week, 8am-5pm) Contact a Nurse Care Coordinator   [phone]   Medical Questions Not Related to Your Discharge  (24 hours a day / 7 days a week)  Contact the Nurse Health Line   [phone]    Medications or Discharge Instructions Refer to your discharge packet   or contact your [facility] Primary Care Provider   [phone]   Follow-up Appointment(s) Schedule your appointment via [employer]   or contact Scheduling [phone]   Billing Review your statement via [employer]  or contact Billing [phone]   Medical Records Review your records via [employer]   or contact Medical Records [phone]     You may receive a telephone call within two days of discharge. This call is to make certain you understand your discharge instructions and have the opportunity to have any questions answered. You can also easily access your medical information, test results and upcoming appointments via the [employer]are free online health management tool. You can learn more and sign up at [employer]are. For assistance setting up your [employer] account, please call [phone].    Once again, we want to ensure your discharge home is safe and that you have a clear understanding of any next steps in your care. If you have any questions or concerns, please do not hesitate to contact us, we are here for you. Thank you for choosing [facility] for your healthcare needs.    Sincerely,    Your [facility] Healthcare Team

## 2017-06-27 NOTE — CATH LAB
Immediate Post-Operative Note      PreOp Diagnosis: ACS    PostOp Diagnosis: 2 V CAD(95% prox PLV and long 70% mid LAD, 405-0% prox LAD, low NL EF  S/p Synergy 3x32 mm to mid LAD and 3.5x15 mm to PLV      Procedure(s) :  Coronary Angiography, Left Heart Catheterization, Left Ventriculography  FFR LAD  PCI LAD and RCA    Surgeon(s):  Jayme Menednhall M.D.    Type of Anesthesia: Moderate Sedation    Specimen: None    Estimated Blood Loss: 15 cc's      Findings: As above. Post PCI no residual, KALA III    Complications: none      Jayme Mendenhall M.D.  6/27/2017 12:28 PM

## 2017-06-27 NOTE — PROGRESS NOTES
Cardiology Progress Note               Author: Star Kern Date & Time created: 6/27/2017  11:07 AM     Interval History:  This note is a hospital consultation that is dictated on [employer] as the hospital dictation is currently not functioning.  The patient is a 56-year-old woman seen in the ER at request of Dr. Francis for evaluation of chest and jaw pain.  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 He was mildly sweaty with this and rated the pain as 7/10 in intensity. The discomfort lasted 15-30 minutes and resolved with rest. Later that day and yesterday, he has similar bouts of discomfort brought on by activity and relieved by rest.  At 1:00 this morning he was awakened by milder discomfort in the same region and this resolved in 10-15 minutes but returned a few minutes later. This prompted his visit to the emergency room.  His cardiac risk factor profiles negative for diabetes smoking or known lipid abnormalities. He has a history of hypertension and has been poorly compliant with his medications. He also has a positive family history of coronary artery disease.  Surgeries: Bilateral vein stripping.  Illnesses: Hypertension. Family history: Father had a heart attack at age 62 and also has a stent in his aorta.                   Mother had a pacer 74. Brother has hypertension.  Social history: , nonsmoker, does not exercise regularly. Works as a       Review of Systems   Constitutional: Negative.  Negative for malaise/fatigue.   HENT: Negative.    Eyes: Negative.    Respiratory: Negative for shortness of breath.    Cardiovascular: Positive for chest pain. Negative for palpitations and leg swelling.   Gastrointestinal: Negative for nausea and vomiting.   Neurological: Negative.  Negative for weakness.   Endo/Heme/Allergies: Does not  bruise/bleed easily.   Psychiatric/Behavioral: Negative.        Physical Exam   Constitutional: He is oriented to person, place, and time. He appears well-developed and well-nourished. No distress.   HENT:   Head: Normocephalic and atraumatic.   Mallampati score is one   Eyes: Conjunctivae and EOM are normal. Pupils are equal, round, and reactive to light. No scleral icterus.   Neck: Neck supple. No JVD present.   Cardiovascular: Normal rate and regular rhythm.    No murmur heard.  Pulmonary/Chest: Effort normal and breath sounds normal. No respiratory distress. He has no wheezes. He has no rales.   Abdominal: Soft. There is no tenderness.   Musculoskeletal: He exhibits no edema.   Neurological: He is alert and oriented to person, place, and time.   Skin: Skin is warm and dry. He is not diaphoretic.       Hemodynamics:  Temp (24hrs), Av.2 °C (99 °F), Min:37.2 °C (99 °F), Max:37.2 °C (99 °F)  Temperature: 37.2 °C (99 °F)  Pulse  Av  Min: 66  Max: 77Heart Rate (Monitored): 69  Blood Pressure: (!) 186/118 mmHg, NIBP: (!) 169/122 mmHg     Respiratory:    Respiration: 16, Pulse Oximetry: 95 %           Fluids:     Weight: 120.203 kg (265 lb)  GI/Nutrition:  Orders Placed This Encounter   Procedures   • DIET NPO     Standing Status: Standing      Number of Occurrences: 1      Standing Expiration Date:      Order Specific Question:  Restrict to:     Answer:  Strict [1]     Lab Results:  Recent Labs      17   WBC  7.1   RBC  5.43   HEMOGLOBIN  15.5   HEMATOCRIT  46.8   MCV  86.2   MCH  28.5   MCHC  33.1*   RDW  42.1   PLATELETCT  226   MPV  9.7     Recent Labs      17   SODIUM  137   POTASSIUM  4.4   CHLORIDE  104   CO2  25   GLUCOSE  99   BUN  23*   CREATININE  1.22   CALCIUM  9.8     Recent Labs      17   APTT  30.0   INR  0.93     Recent Labs      17   BNPBTYPENAT  38     Recent Labs      17   TROPONINI  0.28*   BNPBTYPENAT  38             Medical  Decision Making, by Problem:  There are no hospital problems to display for this patient.      Plan:  EKG: The EKG is personally reviewed and demonstrates sinus rhythm with subtle T-wave inversion in aVF, otherwise normal.    Chest pain: Patient is a classic history of angina with bilateral jaw aching radiating down to his anterior neck and upper chest. This is brought on by activity and relieved by rest. Troponin is abnormal 0.28 which is still in the indeterminate zone. EKG does not show any severe ST-T changes.  Since the patient had 2 episodes of rest pain I recommend he go urgently to the cath lab. The coronary angiography and intervention procedures were explained to the patient and his wife. The inherent risks of conscious sedation, bleeding, and kidney failure were discussed. Also the risk of stroke and heart attack were discussed.  Uncontrolled hypertension. His blood pressure was 180/120. He'll be started on intravenous nitroglycerin for blood pressure control. Compliance with medications was strongly advised.  Unknown lipid status: Lipid profile will be obtained.    Core Measures

## 2017-06-27 NOTE — IP AVS SNAPSHOT
[employer] Access Code: [email or web address]  Expires: 7/28/2017 10:21 AM    [employer]  A secure, online tool to manage your health information     [employer]’s [employer]® is a secure, online tool that connects you to your personalized health information from the privacy of your home -- day or night - making it very easy for you to manage your healthcare. Once the activation process is completed, you can even access your medical information using the [employer] lukasz, which is available for free in the Apple Lukasz store or Google Play store.     [employer] provides the following levels of access (as shown below):   My Chart Features   [facility] Primary Care Doctor [facility]  Specialists [facility]  Urgent  Care Non-[facility]  Primary Care  Doctor   Email your healthcare team securely and privately 24/7 X X X X   Manage appointments: schedule your next appointment; view details of past/upcoming appointments X      Request prescription refills. X      View recent personal medical records, including lab and immunizations X X X X   View health record, including health history, allergies, medications X X X X   Read reports about your outpatient visits, procedures, consult and ER notes X X X X   See your discharge summary, which is a recap of your hospital and/or ER visit that includes your diagnosis, lab results, and care plan. X X       How to register for [employer]:  1. Go to  [email or web address].  2. Click on the Sign Up Now box, which takes you to the New Member Sign Up page. You will need to provide the following information:  a. Enter your [employer] Access Code exactly as it appears at the top of this page. (You will not need to use this code after you’ve completed the sign-up process. If you do not sign up before the expiration date, you must request a new code.)   b. Enter your date of birth.   c. Enter your home email address.   d. Click Submit, and follow the next screen’s instructions.  3. Create a [employer] ID. This will be your [employer]  login ID and cannot be changed, so think of one that is secure and easy to remember.  4. Create a [employer] password. You can change your password at any time.  5. Enter your Password Reset Question and Answer. This can be used at a later time if you forget your password.   6. Enter your e-mail address. This allows you to receive e-mail notifications when new information is available in [employer].  7. Click Sign Up. You can now view your health information.    For assistance activating your [employer] account, call [phone]

## 2017-06-28 ENCOUNTER — PATIENT OUTREACH (OUTPATIENT)
Dept: HEALTH INFORMATION MANAGEMENT | Facility: OTHER | Age: 57
End: 2017-06-28

## 2017-06-28 VITALS
RESPIRATION RATE: 17 BRPM | WEIGHT: 266.76 LBS | OXYGEN SATURATION: 97 % | BODY MASS INDEX: 36.13 KG/M2 | DIASTOLIC BLOOD PRESSURE: 106 MMHG | HEIGHT: 72 IN | HEART RATE: 75 BPM | SYSTOLIC BLOOD PRESSURE: 150 MMHG | TEMPERATURE: 97.5 F

## 2017-06-28 LAB
ANION GAP SERPL CALC-SCNC: 7 MMOL/L (ref 0–11.9)
BASOPHILS # BLD AUTO: 0.3 % (ref 0–1.8)
BASOPHILS # BLD: 0.03 K/UL (ref 0–0.12)
BUN SERPL-MCNC: 19 MG/DL (ref 8–22)
CALCIUM SERPL-MCNC: 8.9 MG/DL (ref 8.5–10.5)
CHLORIDE SERPL-SCNC: 107 MMOL/L (ref 96–112)
CHOLEST SERPL-MCNC: 142 MG/DL (ref 100–199)
CO2 SERPL-SCNC: 24 MMOL/L (ref 20–33)
CREAT SERPL-MCNC: 1.24 MG/DL (ref 0.5–1.4)
EOSINOPHIL # BLD AUTO: 0.21 K/UL (ref 0–0.51)
EOSINOPHIL NFR BLD: 2.3 % (ref 0–6.9)
ERYTHROCYTE [DISTWIDTH] IN BLOOD BY AUTOMATED COUNT: 42.7 FL (ref 35.9–50)
GFR SERPL CREATININE-BSD FRML MDRD: >60 ML/MIN/1.73 M 2
GLUCOSE SERPL-MCNC: 78 MG/DL (ref 65–99)
HCT VFR BLD AUTO: 43.7 % (ref 42–52)
HDLC SERPL-MCNC: 25 MG/DL
HGB BLD-MCNC: 14.5 G/DL (ref 14–18)
IMM GRANULOCYTES # BLD AUTO: 0.04 K/UL (ref 0–0.11)
IMM GRANULOCYTES NFR BLD AUTO: 0.4 % (ref 0–0.9)
LDLC SERPL CALC-MCNC: 71 MG/DL
LV EJECT FRACT  99904: 60
LV EJECT FRACT MOD 2C 99903: 46.98
LV EJECT FRACT MOD 4C 99902: 61.37
LV EJECT FRACT MOD BP 99901: 55.61
LYMPHOCYTES # BLD AUTO: 2.15 K/UL (ref 1–4.8)
LYMPHOCYTES NFR BLD: 23.7 % (ref 22–41)
MCH RBC QN AUTO: 28.6 PG (ref 27–33)
MCHC RBC AUTO-ENTMCNC: 33.2 G/DL (ref 33.7–35.3)
MCV RBC AUTO: 86.2 FL (ref 81.4–97.8)
MONOCYTES # BLD AUTO: 0.75 K/UL (ref 0–0.85)
MONOCYTES NFR BLD AUTO: 8.3 % (ref 0–13.4)
NEUTROPHILS # BLD AUTO: 5.9 K/UL (ref 1.82–7.42)
NEUTROPHILS NFR BLD: 65 % (ref 44–72)
NRBC # BLD AUTO: 0 K/UL
NRBC BLD AUTO-RTO: 0 /100 WBC
PLATELET # BLD AUTO: 218 K/UL (ref 164–446)
PMV BLD AUTO: 10.1 FL (ref 9–12.9)
POTASSIUM SERPL-SCNC: 4 MMOL/L (ref 3.6–5.5)
RBC # BLD AUTO: 5.07 M/UL (ref 4.7–6.1)
SODIUM SERPL-SCNC: 138 MMOL/L (ref 135–145)
TRIGL SERPL-MCNC: 231 MG/DL (ref 0–149)
WBC # BLD AUTO: 9.1 K/UL (ref 4.8–10.8)

## 2017-06-28 PROCEDURE — A9270 NON-COVERED ITEM OR SERVICE: HCPCS | Performed by: HOSPITALIST

## 2017-06-28 PROCEDURE — 80048 BASIC METABOLIC PNL TOTAL CA: CPT

## 2017-06-28 PROCEDURE — 80061 LIPID PANEL: CPT

## 2017-06-28 PROCEDURE — 700101 HCHG RX REV CODE 250: Performed by: INTERNAL MEDICINE

## 2017-06-28 PROCEDURE — A9270 NON-COVERED ITEM OR SERVICE: HCPCS | Performed by: INTERNAL MEDICINE

## 2017-06-28 PROCEDURE — 99239 HOSP IP/OBS DSCHRG MGMT >30: CPT | Performed by: HOSPITALIST

## 2017-06-28 PROCEDURE — 36415 COLL VENOUS BLD VENIPUNCTURE: CPT

## 2017-06-28 PROCEDURE — 700102 HCHG RX REV CODE 250 W/ 637 OVERRIDE(OP): Performed by: INTERNAL MEDICINE

## 2017-06-28 PROCEDURE — 97535 SELF CARE MNGMENT TRAINING: CPT

## 2017-06-28 PROCEDURE — 700102 HCHG RX REV CODE 250 W/ 637 OVERRIDE(OP): Performed by: HOSPITALIST

## 2017-06-28 PROCEDURE — 85025 COMPLETE CBC W/AUTO DIFF WBC: CPT

## 2017-06-28 RX ORDER — PRASUGREL 10 MG/1
10 TABLET, FILM COATED ORAL DAILY
Qty: 30 TAB | Refills: 3 | Status: SHIPPED | OUTPATIENT
Start: 2017-06-28 | End: 2020-05-30

## 2017-06-28 RX ORDER — LOSARTAN POTASSIUM 50 MG/1
50 TABLET ORAL ONCE
Status: COMPLETED | OUTPATIENT
Start: 2017-06-28 | End: 2017-06-28

## 2017-06-28 RX ORDER — ATORVASTATIN CALCIUM 40 MG/1
40 TABLET, FILM COATED ORAL
Qty: 30 TAB | Refills: 3 | Status: SHIPPED | OUTPATIENT
Start: 2017-06-28 | End: 2017-06-28

## 2017-06-28 RX ORDER — PRASUGREL 10 MG/1
10 TABLET, FILM COATED ORAL DAILY
Qty: 30 TAB | Refills: 3 | Status: SHIPPED | OUTPATIENT
Start: 2017-06-28 | End: 2017-06-28

## 2017-06-28 RX ORDER — ATORVASTATIN CALCIUM 40 MG/1
40 TABLET, FILM COATED ORAL
Qty: 30 TAB | Refills: 3 | Status: SHIPPED | OUTPATIENT
Start: 2017-06-28 | End: 2020-05-30

## 2017-06-28 RX ADMIN — LOSARTAN POTASSIUM 50 MG: 50 TABLET, FILM COATED ORAL at 07:32

## 2017-06-28 RX ADMIN — LOSARTAN POTASSIUM 50 MG: 50 TABLET, FILM COATED ORAL at 09:53

## 2017-06-28 RX ADMIN — LABETALOL HYDROCHLORIDE 10 MG: 5 INJECTION, SOLUTION INTRAVENOUS at 08:41

## 2017-06-28 RX ADMIN — ASPIRIN 325 MG: 325 TABLET, COATED ORAL at 07:32

## 2017-06-28 RX ADMIN — PRASUGREL HYDROCHLORIDE 10 MG: 10 TABLET, FILM COATED ORAL at 07:32

## 2017-06-28 RX ADMIN — METOPROLOL TARTRATE 25 MG: 25 TABLET, FILM COATED ORAL at 06:14

## 2017-06-28 ASSESSMENT — PAIN SCALES - GENERAL
PAINLEVEL_OUTOF10: 0

## 2017-06-28 ASSESSMENT — ENCOUNTER SYMPTOMS
CARDIOVASCULAR NEGATIVE: 1
NEUROLOGICAL NEGATIVE: 1

## 2017-06-28 NOTE — PROGRESS NOTES
Tele: SR 63 to 77  .20 / .08 / .36    Pt w/o c/o. Relief today from Tylenol PO PRN for HA. Angiomax stopped this evening. Slowing taking hemoband off. R pulse +, Waveform on  WDL. Pt and his wife deny questions or needs. Will pass care to NOC RN @ [facility].

## 2017-06-28 NOTE — THERAPY
"pt reports he is up self and deficits. provided pt and family handout on \"physical activity s/p MI\" Discussed NSTEMI. troponins norms and levels and EF norms and levels. Discussed 30 min fo activity daily to strengthen the heart of need to stay below thresholds to allow heart to heal over the next 6 weeks. Discussed thresholds for activity of: talk test, RPE = 13/70% and equivalant sign (CP/neck pain). Discussed adaption of ADLs and daily life. Discussed cardiac rehab, destressing, exercise and diet. pt and family had all questions answered and feels ready to [facility]. no further need for skilled acute PT services.   "

## 2017-06-28 NOTE — CARE PLAN
Problem: Nutritional:  Goal: Patient to verbalize or demonstrate understanding of diet  Outcome: No learning evidence as pt declined education. Date :  06/28/17  RD attempted heart healthy diet education, pt declined verbal education but accepted handouts to read on his own. Resources for follow up provided.

## 2017-06-28 NOTE — DISCHARGE SUMMARY
CHIEF COMPLAINT ON ADMISSION  Chief Complaint   Patient presents with   • Jaw Pain   • Shortness of Breath       CODE STATUS  Full Code    HPI & HOSPITAL COURSE  This is a 56 y.o. male here with NSTEMI.  The patient had pain in his mid chest that radiated into his neck 3 days ago when he was trimming the hedges.  He was sweating with this pain. He stopped his activity as he thought he strained a muscle and his pain resolved with rest. The last two days the pain in his neck and chest comes back even with walking and resolves with rest so he comes to the hospital to be evaluated. Troponin was slightly elevated at 0.28, thus cardiology was consulted and he was taken to the cath lab immediately with the finding of 2 vessel CAD with placement of two stents. The patient recovered well and is free from chest pain at the time of discharge.    Therefore, he is discharged in good and stable condition with close outpatient follow-up.    SPECIFIC OUTPATIENT FOLLOW-UP  Cardiology, patient is changing from [place] system.    DISCHARGE PROBLEM LIST  Active Problems:    NSTEMI (non-ST elevated myocardial infarction) (CMS-[facility]) POA: Yes    Unstable angina (CMS-[facility]) POA: Yes    Essential hypertension POA: Yes  Resolved Problems:    * No resolved hospital problems. *      FOLLOW UP  No future appointments.  Pcp Not In Computer            MEDICATIONS ON DISCHARGE   Ivan Ledezma   Home Medication Instructions TRISTIAN:[id number]    Printed on:06/28/17 2225   Medication Information                      aspirin EC (ECOTRIN) 81 MG Tablet Delayed Response  Take 1 Tab by mouth every day.             atorvastatin (LIPITOR) 40 MG Tab  Take 1 Tab by mouth every bedtime.             losartan (COZAAR) 50 MG Tab  Take 100 mg by mouth every day.             metoprolol (LOPRESSOR) 25 MG Tab  Take 1 Tab by mouth 2 times a day.             prasugrel (EFFIENT) 10 MG Tab  Take 1 Tab by mouth every day.                 DIET  Orders Placed This Encounter    Procedures   • Diet Order     Standing Status: Standing      Number of Occurrences: 1      Standing Expiration Date:      Order Specific Question:  Diet:     Answer:  Cardiac [6]       ACTIVITY  As tolerated.  Weight bearing as tolerated      CONSULTATIONS  Cardiology  Cardiac Rehab    PROCEDURES  PreOp Diagnosis: ACS    PostOp Diagnosis: 2 V CAD(95% prox PLV and long 70% mid LAD, 405-0% prox LAD, low NL EF  S/p Synergy 3x32 mm to mid LAD and 3.5x15 mm to PLV      Procedure(s) :  Coronary Angiography, Left Heart Catheterization, Left Ventriculography  FFR LAD  PCI LAD and RCA    Surgeon(s):  Jayme Mendenhall M.D.    LABORATORY  Lab Results   Component Value Date/Time    SODIUM 138 06/28/2017 02:05 AM    POTASSIUM 4.0 06/28/2017 02:05 AM    CHLORIDE 107 06/28/2017 02:05 AM    CO2 24 06/28/2017 02:05 AM    GLUCOSE 78 06/28/2017 02:05 AM    BUN 19 06/28/2017 02:05 AM    CREATININE 1.24 06/28/2017 02:05 AM        Lab Results   Component Value Date/Time    WBC 9.1 06/28/2017 02:05 AM    HEMOGLOBIN 14.5 06/28/2017 02:05 AM    HEMATOCRIT 43.7 06/28/2017 02:05 AM    PLATELET COUNT 218 06/28/2017 02:05 AM        Total time of the discharge process exceeds 32 minutes

## 2017-06-28 NOTE — CARE PLAN
Problem: Knowledge Deficit  Goal: Knowledge of disease process/condition, treatment plan, diagnostic tests, and medications will improve  Outcome: PROGRESSING AS EXPECTED  Discussed plan of care for today w/ pt this am, pt is s/p heart cath, he is A+O, he verbalizes understanding of his plan of care, no questions. Emotional support given. Will monitor for educational needs. Awaiting cardiology and hospitalist rounds this am.         Problem: Pain Management  Goal: Pain level will decrease to patient’s comfort goal  Outcome: PROGRESSING AS EXPECTED  Assessing every four hrs for pain per protocol; see doc flowsheets. Denies c/o pain this am.

## 2017-06-28 NOTE — PROGRESS NOTES
Pt discharging. No c/o. Discussed pt's care with Hospitalist & Cardiology; Cardiology aware of BP's - OK to discharge. Dr. Kern in to see pt. Discussed pt's care with SW and , pt educated concerning insurance, RX's and F/U w/ Simon. Discharge instructions given to patient at bedside, verbalizes understanding and states plans for follow-up. Telemetry monitor/IV cathlon removed. All belongings accounted for, all questions answered at this time. Pt leave floor walking, no difficulties, no c/o, family @ side. Declines offer of , Hospital escort.

## 2017-06-28 NOTE — PROGRESS NOTES
Cardiology Progress Note               Author: Star Kern Date & Time created: 2017  9:19 AM     Interval History:  Admitted yesterday morning with ACS.  Troponins negative.  RCA and Mid LAD bifurcation stented with good result.  Rare PVC's and 5 beat lynda of [place] overnight.    Review of Systems   Cardiovascular: Negative.    Neurological: Negative.    Endo/Heme/Allergies: Negative.        Physical Exam   Constitutional: He is oriented to person, place, and time. No distress.   HENT:   Head: Atraumatic.   Eyes: No scleral icterus.   Neck: No JVD present.   Cardiovascular: Normal rate and regular rhythm.  Exam reveals no friction rub.    No murmur heard.  Pulmonary/Chest: Breath sounds normal. No respiratory distress.   Musculoskeletal: He exhibits no edema.   Neurological: He is alert and oriented to person, place, and time.   Skin: Skin is warm and dry.   Psychiatric: He has a normal mood and affect.       Hemodynamics:  Temp (24hrs), Av.5 °C (97.7 °F), Min:36.3 °C (97.4 °F), Max:36.7 °C (98.1 °F)  Temperature: 36.4 °C (97.5 °F)  Pulse  Av.4  Min: 60  Max: 77Heart Rate (Monitored): 69  Blood Pressure: 155/114 mmHg, NIBP: (!) 169/122 mmHg     Respiratory:    Respiration: 17, Pulse Oximetry: 97 %        RLL Breath Sounds: Diminished, LLL Breath Sounds: Diminished  Fluids:  Date 17 0700 - 17 0659   Shift 8388-6388 1185-8343 1337-8423 24 Hour Total   I  N  T  A  K  E   Shift Total       O  U  T  P  U  T   Urine 550   550    Shift Total 550   550   Weight (kg) 121 121 121 121       Weight: 121 kg (266 lb 12.1 oz)  GI/Nutrition:  Orders Placed This Encounter   Procedures   • Diet Order     Standing Status: Standing      Number of Occurrences: 1      Standing Expiration Date:      Order Specific Question:  Diet:     Answer:  Cardiac [6]     Lab Results:  Recent Labs      17   0928  17   0205   WBC  7.1  9.1   RBC  5.43  5.07   HEMOGLOBIN  15.5  14.5   HEMATOCRIT  46.8  43.7   MCV   86.2  86.2   MCH  28.5  28.6   MCHC  33.1*  33.2*   RDW  42.1  42.7   PLATELETCT  226  218   MPV  9.7  10.1     Recent Labs      06/27/17   0928  06/28/17   0205   SODIUM  137  138   POTASSIUM  4.4  4.0   CHLORIDE  104  107   CO2  25  24   GLUCOSE  99  78   BUN  23*  19   CREATININE  1.22  1.24   CALCIUM  9.8  8.9     Recent Labs      06/27/17   0928   APTT  30.0   INR  0.93     Recent Labs      06/27/17   0928   BNPBTYPENAT  38     Recent Labs      06/27/17   0928  06/27/17   1453   TROPONINI  0.28*  0.28*   BNPBTYPENAT  38   --      Recent Labs      06/28/17   0205   TRIGLYCERIDE  231*   HDL  25*   LDL  71         Medical Decision Making, by Problem:  Active Hospital Problems    Diagnosis   • NSTEMI (non-ST elevated myocardial infarction) (CMS-[facility]) [I21.4]   • Unstable angina (CMS-[facility]) [I20.0]   • Essential hypertension [I10]       Plan:  Can discharge today.  Use of NTG  And compliance with DAPT discussed with patient.  Also recommend intensive cardiac rehab.    Core Measures

## 2017-06-28 NOTE — CARE PLAN
Problem: Medication  Goal: Compliance with prescribed medication will improve  Intervention: Educate patient and significant other/support system medication rationale and regimen  Educated patient about new medications and their importance to prevent another heart attack.      Problem: Unstable Angina/Chest Pain/Non Stemi  Goal: Optimal Care of the Angina/Chest Pain/Non Stemi Patient  Intervention: Vital Signs and Cardiac Monitoring  q4 vitals and continuous cardiac monitoring in place      Intervention: Troponin, CBC, Chem Panel, Lipid Profile, INR, PT, PTT, Chest X-Ray per orders  Done  Intervention: EKG on admission and every 6 hours: obtain with all episodes of chest pain  Done  Intervention: Echo if signs and symptoms of CHF or hemodynamic instability  Done  Intervention: Consider aspirin, beta blocker, ACE inhibitor, statin per order  ASA, metoprolol, lipitor, and effient ordered post cath. Continued pt's home cozaar continued  Intervention: Consider persantine thallium (ensure caffeine free diet) or Cardiac Cath  Cath done, stents placed

## 2017-06-28 NOTE — CARE PLAN
Problem: Knowledge Deficit  Goal: Knowledge of disease process/condition, treatment plan, diagnostic tests, and medications will improve  Outcome: PROGRESSING AS EXPECTED  D/C orders in. Educating pt concerning discharge instructions. He is A+O, he verbalizes understanding, answering questions.    Problem: Pain Management  Goal: Pain level will decrease to patient’s comfort goal  Outcome: PROGRESSING AS EXPECTED  Assessing every four hrs for pain per protocol; see doc flowsheets. No c/o of pain this am.

## 2017-06-28 NOTE — DISCHARGE PLANNING
Medical Social Work    SW called [facility] pharmacy to follow up on pt's co pay for Effient. Pt currently insured with [employer]. STACIE was informed that [facility] is unable to fill medications through [employer].

## 2017-06-28 NOTE — PROGRESS NOTES
Received report and assumed care of patient. Assessment complete, VSS, no complaints at this time. Hemaband removed and dressing placed, site is CDI and soft. Pt educated about cath site care, limited wrist movement, and risk for bleeding. Pt also educated about new medications, [place] education sheets provided. Discussed plan of care with patient and answered all questions. Fall precautions in place, bed alarm on, and call light in reach. Will continue to monitor.

## 2017-06-28 NOTE — PROGRESS NOTES
Cardiovascular Nurse Navigator ([phone]) Note:    Reviewed ACS/PCI medications:  Dual Antiplatelet Therapy (DAPT):  aspirin + prasugrel  Beta-Blocker:  metoprolol  Statin:  atorvastatin    Consider for aldosterone blockade?   N/a--EF WNL    Intensive Cardiac Rehab (ICR) Referral:  Referred on 6/27; has current inpatient orders for nutrition consult & PT for Phase I ICR    Cardiology Follow-Up:  [place] address but [place] insurance--will have to check with cardiology office prior to scheduling    Inpatient & Discharge Patient Education:  Bedside nursing to continually provide patient education on ACS meds, signs and symptoms to monitor for, and risk factor modification. Also at discharge please complete the “ACS” special instructions on the AVS.  Thank you and please call with questions.

## 2017-06-28 NOTE — PROGRESS NOTES
Received care of pt from NOC RN this am. Pt is A+O. No c/o. BP elevated, scheduled medications given. Monitoring BP. Anticipating D/C.

## 2017-06-28 NOTE — DISCHARGE INSTRUCTIONS
Discharge Instructions    Discharged to home by car with relative. Discharged via walking, hospital escort: declines  Special equipment needed: Not Applicable    Be sure to schedule a follow-up appointment with your primary care doctor or any specialists as instructed.     Discharge Plan:   Diet Plan: Discussed - cardiac diet.  Activity Level: Discussed - activity as tolerated  Confirmed Follow up Appointment: Patient to Call and Schedule Appointment - Follow up with your primary care MD in [facility]. Follow up with cardiology as directed by a cardiologist - PLEASE ESTABLISH WITH A CARDIOLOGIST IN [facility]  Confirmed Symptoms Management: Discussed  Medication Reconciliation Updated: Yes  Influenza Vaccine Indication: Patient Refuses    I understand that a diet low in cholesterol, fat, and sodium is recommended for good health. Unless I have been given specific instructions below for another diet, I accept this instruction as my diet prescription.       Special Instructions: Diagnosis:  Acute Coronary Syndrome (ACS) is a diagnosis that encompasses cardiac-related chest pain and heart attack. ACS occurs when the blood flow to the heart muscle is severely reduced or cut off completely due to a slow process called atherosclerosis.  Atherosclerosis is a disease in which the coronary arteries become narrow from a buildup of fat, cholesterol, and other substances that combine to form plaque. If the plaque breaks, a blood clot will form and block the blood flow to the heart muscle. This lack of blood flow can cause damage or death to the heart muscle which is called a heart attack or Myocardial Infarction (MI). There are two different types of MIs:  ST Elevation Myocardial Infarction or STEMI (the most severe type of heart attack) and Non-ST Elevation Myocardial Infarction or NSTEMI.    Treatment Plan:  · Cardiac Diet  - Low fat, low salt, low cholesterol   · Cardiac Rehab  - Your doctor has ordered  you a referral to [facility].  Call [phone] to schedule an appointment.  · Attend my follow-up appointment with my Cardiologist.  · Take my medications as prescribed by my doctor  · Exercise daily  · Lower my bad cholesterol and raise my good cholesterol    Medications:  Certain medications are used to treat ACS.  Remember to always take medications as prescribed and never stop talking medications unless told by your doctor.    You have been prescribed the following medicatons:    Aspirin - Aspirin is used as a blood thinning medication and you will require this medication indefinitely.  Anti-platelet/blood thinner - Your Anti-platelet/Blood thinning medication is called PRASUGREL, and is used in combination with aspirin to prevent clots from forming in your heart and/or around your stent.  Your doctor will determine how long you need to be on this medicine.  Beta-Blocker - Beta-Blocker METOPROLOL  is used to lower blood pressure and heart rate, and/or helps your heart heal after a heart attack.  Statin - Statin ATORVASTATIN is used to lower cholesterol.  Angiotensin Receptor Blocker (ARB) - Angiotensin Receptor Blocker LOSARTAN is used to lower blood pressure and treat heart failure.    Radial Catherization Discharge Instructions      · Do not subject hand/arm to any forceful movements for 24 hours    i.e. supporting weight when rising from the chair or bed.   · Do not drive a car for 24 hours  · You may remove the dressing tomorrow  · You may shower on the day following your procedure.  Do not take a tub bath or submerge the puncture site in water for 3 days following the procedure.  · No Lifting more than 3-5 pounds with affected wrist for 5 days  · Follow up with cardiology per direction of cardiologist  · Increase fluids for 2 days post procedure.  · Continue all previous medications unless otherwise instructed.    If bleeding should occur following discharge:  · Sit down and apply firm pressure to site with  your fingers for 10 minutes  · If the bleeding stops, continue to sit quietly, keeping your wrist straight for 2 hours.  Notify physician as soon as possible [facility] CARDIOLOG[facility]   · If bleeding does not stop after 10 minutes, or if there is a large amount of bleeding or spurting, call 911 immediately.  Do not drive yourself to the hospital.  ·   · Is patient discharged on Warfarin / Coumadin?   No     · Is patient Post Blood Transfusion?  No    Depression / Suicide Risk    As you are discharged from this [facility], it is important to learn how to keep safe from harming yourself.    Recognize the warning signs:  · Abrupt changes in personality, positive or negative- including increase in energy   · Giving away possessions  · Change in eating patterns- significant weight changes-  positive or negative  · Change in sleeping patterns- unable to sleep or sleeping all the time   · Unwillingness or inability to communicate  · Depression  · Unusual sadness, discouragement and loneliness  · Talk of wanting to die  · Neglect of personal appearance   · Rebelliousness- reckless behavior  · Withdrawal from people/activities they love  · Confusion- inability to concentrate     If you or a loved one observes any of these behaviors or has concerns about self-harm, here's what you can do:  · Talk about it- your feelings and reasons for harming yourself  · Remove any means that you might use to hurt yourself (examples: pills, rope, extension cords, firearm)  · Get professional help from the community (Mental Health, Substance Abuse, psychological counseling)  · Do not be alone:Call your Safe Contact- someone whom you trust who will be there for you.  · Call your local CRISIS HOTLINE 596-8621 or 502-325-8376  · Call your local Children's Mobile Crisis Response Team [place] (898) 272-1754 or www.[employer]  · Call the toll free National Suicide Prevention Hotlines   · National Suicide Prevention  Lifeline 643-268-SJRM (9131)  · National [place] [employer] Network 800-SUICIDE (683-0114)

## 2017-06-28 NOTE — PROGRESS NOTES
Pt resting comfortably in bed, no needs at this time. No change from previous assessment, will continue to monitor

## 2017-06-29 LAB — EKG IMPRESSION: NORMAL

## 2017-07-01 NOTE — CATH LAB
CARDIAC CATHETERIZATION REPORT    Procedure date June 27, 2017    PreOp Diagnosis: Acute coronary syndrome    PostOp Diagnosis:   1. Two vessel coronary artery disease; long 70% mid left anterior descending (LAD) artery stenosis,40-50% proximal LAD stenosis and 95% proximal posterolateral branch stenosis  2. Basal inferior wall hypokinesis. Normal overall left ventricular systolic function. Ejection fraction is 55%.  3. Status post stenting of the mid left anterior descending artery with 3x32 mm Synergy drug eluting stent  4. Status post stenting of the posterolateral branch PLB) with Synergy 3.5x15 mm drug eluting tent    Procedure(s) :     1. Coronary Angiography, Left Heart Catheterization, Left Ventriculography  2. Fractional flow reserve (FFR) measurement of the LAD  3. Percutaneous intervention of the LAD and posterolateral branch of the right coronary artery    Complications: none    Surgeon(s):  Jayme Mendenhall M.D.      Descriptions:    After inform consent was obtained, the patient was brought to cardiac catheterization laboratory in fasting state. Abdiaziz test was carried out on the right hand and was found to be negative.  Right wrist and right groin were then prepped and drapped in sterile fashion.  Versed and fentanyl were used for conscious sedation.  A 6 English Terumo sheath was then placed in the right radial artery using Seldinger technique.  A 2.5 mg of verapamil, 100 microgram of nitroglycerine and 4000 units of heparin were administered into the radial sheath.    Selective angiography of the right and left coronary artery were then performed in multiple views using 5 English TIG catheter.  Left ventriculography was performed using 30 cc of contrast injected over 3 seconds using pigtail catheter.  Left heart pullback was then performed.    After reviewing diagnostic angiography, we first decided to assess LAD stenosis further with FFR.  Angiomax was started for anticoagulation.   6 English EBU  3.5 guide was engaged into the left main.  Flow wire was then advanced past stenosis yaya distal LAD. Adenosine was started per protocol. FFR decreased to 0.72 with less than a minuteit into the infusion indicating flow limiting stenosis.   There was large diagonal branch originating from the area. A 0.014v BMW wire was advanced into the diagonal branch.  The diagonal ostium was dilated with 2.5 mm balloon.  Over the FFR wire, the LAD lesion was stented with 3x32 mm Synergy drug eluting stent.  The stent was post dilated with 3 mm non-compliance balloon upto 14 ATMs.  Subsequent angiography showed good result. The guide wire and guide catheter were then removed.    We then directed our attention to the PLB stenosis.  6 [place]uneian JR 3.5 guide was seated into RCA but did not provide good support.  BMW wire was advanced into RCA past the stenosis in PLB.  6 [place] Guidezilla was then utilized. The lesion was predilated with 2.5 mm balloon.  A 3.5x15 mm Synergy stent was then deployed and post dilated with non compliance balloon upto 12-14 ATMs.  The final angiography was performed which confirmed good result.  The guiding catheter was then removed. Radial sheath was subsequently removed.  Hemostasis was obtained using Terumo TR wrist band.  The patient was given loading dose of prasugrel.  Bivalirudin was reduced to low dose infusion.  The patient tolerated procedure well and left cardiac catheterization laboratory in stable condition.    Findings:     There is no gradient across aortic valve.  Left ventricular end-diastolic pressure was normal.    Left ventriculography showed hypokinesis of the basal inferior wall.  Overall LV systolic function is normal.  Ejection fraction is calculated to be 55 %.    This is right dominant system.    Left main is large and without flow limiting disease.  It bifurcated into left anterior descending and left circumflex artery.    Left anterior descending artery is large caliber vessel and  extends to the apex.  It gives rises to small first diagonal and large second diagonal branch in the mid segment.  There was 40-50% eccentric proximal stenosis but normal FFR. The mid LAD had long 70% stenosis.  There is no signficant disease in the distal left anterior descending artery.  The antegrade flow is normal.  As mentioned above, FFR of mid LAD indicated flow limiting stenosis.    Left circumflex artery is large caliber. It gives rise to one large and one small obtuse marginal branches.  There is no significant disease in the LCX system.  The antegrade flow is normal.    Right coronary artery (RCA) is large caliber. It gives rise to several small acute marginal branches, posterior descending artery and posterolateral branch.  There was 95% stenosis in the proximal portion of the posterolateral branch (PLB).  There is also 40-50% mid RCA stenosis.  The antegrade flow was however normal.    After stenting there was no residual stenosis int the stented segments in the LAD or PLB of the RCA with good antegrade flow.    Plan: dual antiplatelet therapy for a year.Agressicve risk factor modifications.      Jayme Mendenhall M.D.  6/30/2017 5:26 PM

## 2018-04-21 ENCOUNTER — APPOINTMENT (OUTPATIENT)
Dept: RADIOLOGY | Facility: MEDICAL CENTER | Age: 58
End: 2018-04-21
Attending: EMERGENCY MEDICINE

## 2018-04-21 ENCOUNTER — HOSPITAL ENCOUNTER (EMERGENCY)
Facility: MEDICAL CENTER | Age: 58
End: 2018-04-21
Attending: EMERGENCY MEDICINE

## 2018-04-21 VITALS
HEIGHT: 72 IN | RESPIRATION RATE: 16 BRPM | DIASTOLIC BLOOD PRESSURE: 90 MMHG | TEMPERATURE: 98.9 F | OXYGEN SATURATION: 97 % | WEIGHT: 272.49 LBS | HEART RATE: 76 BPM | SYSTOLIC BLOOD PRESSURE: 141 MMHG | BODY MASS INDEX: 36.91 KG/M2

## 2018-04-21 DIAGNOSIS — M25.532 LEFT WRIST PAIN: ICD-10-CM

## 2018-04-21 DIAGNOSIS — S52.615A CLOSED NONDISPLACED FRACTURE OF STYLOID PROCESS OF LEFT ULNA, INITIAL ENCOUNTER: ICD-10-CM

## 2018-04-21 DIAGNOSIS — S52.502A CLOSED FRACTURE OF DISTAL END OF LEFT RADIUS, UNSPECIFIED FRACTURE MORPHOLOGY, INITIAL ENCOUNTER: ICD-10-CM

## 2018-04-21 PROCEDURE — 700101 HCHG RX REV CODE 250: Performed by: EMERGENCY MEDICINE

## 2018-04-21 PROCEDURE — 99285 EMERGENCY DEPT VISIT HI MDM: CPT

## 2018-04-21 PROCEDURE — 25605 CLTX DST RDL FX/EPHYS SEP W/: CPT

## 2018-04-21 PROCEDURE — 29125 APPL SHORT ARM SPLINT STATIC: CPT

## 2018-04-21 PROCEDURE — 304561 HCHG STAT O2

## 2018-04-21 PROCEDURE — 73110 X-RAY EXAM OF WRIST: CPT | Mod: LT

## 2018-04-21 PROCEDURE — 700111 HCHG RX REV CODE 636 W/ 250 OVERRIDE (IP): Performed by: EMERGENCY MEDICINE

## 2018-04-21 PROCEDURE — 96376 TX/PRO/DX INJ SAME DRUG ADON: CPT

## 2018-04-21 PROCEDURE — 700102 HCHG RX REV CODE 250 W/ 637 OVERRIDE(OP): Performed by: EMERGENCY MEDICINE

## 2018-04-21 PROCEDURE — A9270 NON-COVERED ITEM OR SERVICE: HCPCS | Performed by: EMERGENCY MEDICINE

## 2018-04-21 PROCEDURE — 302874 HCHG BANDAGE ACE 2 OR 3""

## 2018-04-21 PROCEDURE — 96374 THER/PROPH/DIAG INJ IV PUSH: CPT

## 2018-04-21 RX ORDER — OXYCODONE HYDROCHLORIDE AND ACETAMINOPHEN 5; 325 MG/1; MG/1
2 TABLET ORAL ONCE
Status: COMPLETED | OUTPATIENT
Start: 2018-04-21 | End: 2018-04-21

## 2018-04-21 RX ORDER — OXYCODONE HYDROCHLORIDE AND ACETAMINOPHEN 5; 325 MG/1; MG/1
1-2 TABLET ORAL EVERY 4 HOURS PRN
Qty: 20 TAB | Refills: 0 | Status: SHIPPED | OUTPATIENT
Start: 2018-04-21 | End: 2018-04-26

## 2018-04-21 RX ORDER — BUPIVACAINE HYDROCHLORIDE 5 MG/ML
20 INJECTION, SOLUTION EPIDURAL; INTRACAUDAL ONCE
Status: COMPLETED | OUTPATIENT
Start: 2018-04-21 | End: 2018-04-21

## 2018-04-21 RX ADMIN — FENTANYL CITRATE 100 MCG: 50 INJECTION, SOLUTION INTRAMUSCULAR; INTRAVENOUS at 19:13

## 2018-04-21 RX ADMIN — BUPIVACAINE HYDROCHLORIDE 20 ML: 5 INJECTION, SOLUTION EPIDURAL; INTRACAUDAL; PERINEURAL at 18:15

## 2018-04-21 RX ADMIN — FENTANYL CITRATE 100 MCG: 50 INJECTION, SOLUTION INTRAMUSCULAR; INTRAVENOUS at 18:12

## 2018-04-21 RX ADMIN — OXYCODONE HYDROCHLORIDE AND ACETAMINOPHEN 2 TABLET: 5; 325 TABLET ORAL at 16:41

## 2018-04-21 ASSESSMENT — ENCOUNTER SYMPTOMS
SENSORY CHANGE: 0
NAUSEA: 0
SHORTNESS OF BREATH: 0
VOMITING: 0
ABDOMINAL PAIN: 0
CHILLS: 0
FEVER: 0
FOCAL WEAKNESS: 0

## 2018-04-21 ASSESSMENT — PAIN SCALES - GENERAL: PAINLEVEL_OUTOF10: 8

## 2018-04-21 ASSESSMENT — LIFESTYLE VARIABLES: DO YOU DRINK ALCOHOL: NO

## 2018-04-21 NOTE — ED TRIAGE NOTES
Amb to triage w/ c/o L wrist pain secondary fall off of a chair while attempting to install a ceiling fan.  + deformity.

## 2018-04-21 NOTE — ED PROVIDER NOTES
ED Provider Note    Scribed for Laverne Whipple M.D. by Doc Ceron. 4/21/2018, 4:39 PM.    Primary care provider: Pcp Not In Computer  Means of arrival: Walk In  History obtained from: Patient  History limited by: None     CHIEF COMPLAINT  Chief Complaint   Patient presents with   • T-5000 GLF   • Wrist Injury     HPI  Ivan Ledezma is a 57 y.o. male who presents to the Emergency Department with left wrist pain.   The patient was standing on a chair in his home this afternoon when he fell backwards off of the chair onto his outstretched left hand. He denies hitting his head or any loss of consciousness.   He rates his current pain as 8/10 in severity, throbbing, which has been constant since his injury, localized over his left wrist. The patient denies any other injuries.   He denies any numbness or weakness. He is right hand dominant.      REVIEW OF SYSTEMS  Review of Systems   Constitutional: Negative for chills and fever.   Respiratory: Negative for shortness of breath.    Cardiovascular: Negative for chest pain.   Gastrointestinal: Negative for abdominal pain, nausea and vomiting.   Musculoskeletal: Positive for joint pain (Left Wrist pain ).   Neurological: Negative for sensory change and focal weakness.   All other systems reviewed and are negative.      PAST MEDICAL HISTORY   has a past medical history of Hypertension.    SURGICAL HISTORY  Pinning to right thumb    SOCIAL HISTORY  Social History   Substance Use Topics   • Smoking status: Never Smoker   • Alcohol use No      History   Drug Use No     FAMILY HISTORY  None pertinent    CURRENT MEDICATIONS  Home Medications    None       ALLERGIES  Allergies   Allergen Reactions   • Motrin [Advil Cold-Sinus]      PHYSICAL EXAM  VITAL SIGNS: /86   Pulse 79   Temp 36.1 °C (96.9 °F) (Temporal)   Resp 16   Ht 1.829 m (6')   Wt 123.6 kg (272 lb 7.8 oz)   SpO2 98%   BMI 36.96 kg/m²   Vitals reviewed by myself.  Physical Exam  Nursing note and vitals  reviewed.  Constitutional: Well-developed and well-nourished. No acute distress.   HENT: Head is normocephalic and atraumatic.  Eyes: extra-ocular movements intact  Cardiovascular: Regular rate and regular rhythm. No murmur heard. 2+ bilateral radial pulses   Pulmonary/Chest: Breath sounds normal. No wheezes or rales.   Abdominal: Soft and non-tender. No distention.    Musculoskeletal: Obvious deformity to left wrist with decreased range of motion of left wrist secondary to deformity. No tenderness to palpation of proximal left forearm or hand. Patient can move all fingers in left hand.   Neurological: Awake and alert. Sensation intact to bilateral median, ulnar, and radial nerve distributions  Skin: Skin is warm and dry. No rash.    DIAGNOSTIC STUDIES     RADIOLOGY  DX-WRIST-COMPLETE 3+ LEFT   Final Result      Dorsally displaced, impacted and angulated fracture of the distal radial metaphysis. Intra-articular extension likely present.      Impacted fracture of the ulna styloid.      Soft tissue swelling.           The radiologist's interpretation of all radiological studies have been reviewed by me.        Joint Reduction Procedure Note    Indication: Joint Reduction     Consent: The patient was counseled regarding the procedure, it's indications, risks, potential complications and alternatives and any questions were answered. Consent was obtained.    Procedure: The pre-reduction exam showed distal perfusion & neurologic function to be normal. The patient was placed in the appropriate position. Anesthesia/pain control was obtained using a hematoma block of the affected area using 0.5% Bupivacaine without epinephrine and fentanyl. Reduction of the left distal radius was performed by direct traction. Post reduction films were obtained and revealed that after splinting the reduction had fallen off, the patient was re-reduced and splinted under the same initial hematoma block, repeat films after 2nd reduction were  satisfactory. A post-reduction exam revealed distal perfusion & neurologic function to be normal. The affected area was immobilized with sugar tong splint and sling.    The patient tolerated the procedure well.    Complications: None           REASSESSMENT  4:30 PM Patient seen and examined at bedside. The patient presents for left wrist injury sustained this afternoon after falling from standing on a chair. A left Wrist X Ray was ordered, and the patient was treated with Percocet 5-325 mg PO for his pain.     5:33 PM Spoke with Dr. Lomax, Orthopedics, about the patient's condition and Left Wrist X Ray findings. Dr. Lomax recommended joint reduction of left wrist in the ED and follow up with his office as outpatient.      5:52 PM Performed joint reduction of left wrist. X Ray films showed satisfactory reduction and patient was placed in sugar tong splint.       COURSE & MEDICAL DECISION MAKING  Nursing notes, VS, PMSFHx reviewed in chart.      Patient is a 57-year-old male who comes in for left wrist pain after fall. Differential diagnosis includes fracture, dislocation, sprain, strain. Diagnostic workup included left wrist x-ray. Next    On initial exam patient is well-appearing with vitals in normal limits. Patient is noted to have an obvious deformity to the left wrist, however he is neurovascularly intact. Patient is treated with Percocet after which he feels improved. X-ray returns demonstrates left wrist fracture. Given angulation I elected to reduce the fracture. Please see procedure note for details. After satisfactory reduction and splinting patient was advised on splint care. I spoke with Dr. Lomax who reviewed the films and agreed reduction was satisfactory. As patient has insurance in [place] he will follow-up there. He is given strict return precautions and prescription for percocet. Patient is then discharged home in stable condition with vitals in normal limits.     In prescribing controlled  substances to this patient, I certify that I have obtained and reviewed the medical history of Ivan Ledezma. I have also made a good mann effort to obtain applicable records from other providers who have treated the patient and narcotics check demonstrated no active narcotics prescription  I have conducted a physical exam and documented it. I have reviewed Mr. Ledezma’s prescription history as maintained by the [place] Prescription Monitoring Program.   I have assessed the patient’s risk for abuse, dependency, and addiction using the validated Opioid Risk Tool available at https://www.mdcalc.com/xqtpct-jhvj-vlwy-ort-narcotic-abuse.   Given the above, I believe the benefits of controlled substance therapy outweigh the risks. The reasons for prescribing controlled substances include in my professional opinion, controlled substances are the only reasonable choice for this patient because fracture. Accordingly, I have discussed the risk and benefits, treatment plan, and alternative therapies with the patient.       DISPOSITION:  Patient will be discharged home in stable condition.    FOLLOW UP:  Dr. Lomax    OUTPATIENT MEDICATIONS:  Percocet    FINAL IMPRESSION  1. Closed fracture of distal end of left radius, unspecified fracture morphology, initial encounter    2. Closed nondisplaced fracture of styloid process of left ulna, initial encounter    3. Left wrist pain          Doc JACKSON (Scribe), am scribing for, and in the presence of, Laverne Whipple M.D..    Electronically signed by: Doc Ceron (Scribe), 4/21/2018    Laverne JACKSON M.D. personally performed the services described in this documentation, as scribed by Doc Ceron in my presence, and it is both accurate and complete.    The note accurately reflects work and decisions made by me.  Laverne Whipple  4/21/2018  7:11 PM

## 2018-04-22 NOTE — DISCHARGE INSTRUCTIONS
Cast or Splint Care  Casts and splints support injured limbs and keep bones from moving while they heal. It is important to care for your cast or splint at home.    HOME CARE INSTRUCTIONS  · Keep the cast or splint uncovered during the drying period. It can take 24 to 48 hours to dry if it is made of plaster. A fiberglass cast will dry in less than 1 hour.  · Do not rest the cast on anything harder than a pillow for the first 24 hours.  · Do not put weight on your injured limb or apply pressure to the cast until your health care provider gives you permission.  · Keep the cast or splint dry. Wet casts or splints can lose their shape and may not support the limb as well. A wet cast that has lost its shape can also create harmful pressure on your skin when it dries. Also, wet skin can become infected.  ¨ Cover the cast or splint with a plastic bag when bathing or when out in the rain or snow. If the cast is on the trunk of the body, take sponge baths until the cast is removed.  ¨ If your cast does become wet, dry it with a towel or a blow dryer on the cool setting only.  · Keep your cast or splint clean. Soiled casts may be wiped with a moistened cloth.  · Do not place any hard or soft foreign objects under your cast or splint, such as cotton, toilet paper, lotion, or powder.  · Do not try to scratch the skin under the cast with any object. The object could get stuck inside the cast. Also, scratching could lead to an infection. If itching is a problem, use a blow dryer on a cool setting to relieve discomfort.  · Do not trim or cut your cast or remove padding from inside of it.  · Exercise all joints next to the injury that are not immobilized by the cast or splint. For example, if you have a long leg cast, exercise the hip joint and toes. If you have an arm cast or splint, exercise the shoulder, elbow, thumb, and fingers.  · Elevate your injured arm or leg on 1 or 2 pillows for the first 1 to 3 days to decrease  swelling and pain. It is best if you can comfortably elevate your cast so it is higher than your heart.  SEEK MEDICAL CARE IF:   · Your cast or splint cracks.  · Your cast or splint is too tight or too loose.  · You have unbearable itching inside the cast.  · Your cast becomes wet or develops a soft spot or area.  · You have a bad smell coming from inside your cast.  · You get an object stuck under your cast.  · Your skin around the cast becomes red or raw.  · You have new pain or worsening pain after the cast has been applied.  SEEK IMMEDIATE MEDICAL CARE IF:   · You have fluid leaking through the cast.  · You are unable to move your fingers or toes.  · You have discolored (blue or white), cool, painful, or very swollen fingers or toes beyond the cast.  · You have tingling or numbness around the injured area.  · You have severe pain or pressure under the cast.  · You have any difficulty with your breathing or have shortness of breath.  · You have chest pain.     This information is not intended to replace advice given to you by your health care provider. Make sure you discuss any questions you have with your health care provider.     Document Released: 12/15/2001 Document Revised: 10/08/2014 Document Reviewed: 06/26/2014  [employer] Patient Education ©2016 [employer]    Extremity Fracture  Broken bones (fractures) take several weeks to months to heal depending on the bone involved. The broken ends must be lined up correctly and kept in position for proper healing. Do not remove the splint, immobilizer, or cast that has been applied to treat your injury. This is the most important part of your treatment. Other measures to treat fractures include:  · Keeping the injured limb at rest and elevated above your heart as recommended by your caregiver. This will help reduce pain and swelling.   · Ice packs can be applied to your fracture site for 20-30 minutes every 3-4 hours over the next 2-3 days.   · Pain  medicine may be prescribed in the first days after a fracture.   SEEK IMMEDIATE MEDICAL CARE IF:  · You develop increasing pain or pressure in the injured limb, or if it becomes cold, numb, or pale.   · There is increasing pain with motion of your fingers or toes.   Document Released: 01/25/2006 Document Revised: 03/11/2013 Document Reviewed: 04/07/2010  [employer]® Patient Information ©2013 [employer].

## 2019-08-22 NOTE — CARE PLAN
Problem: Knowledge Deficit  Goal: Knowledge of disease process/condition, treatment plan, diagnostic tests, and medications will improve  Outcome: PROGRESSING AS EXPECTED  Discussed plan of care for this afternoon / post - cath care w/ pt upon his arrival to [facility], he is A+O, he verbalizes understanding of his plan of care, no questions. Emotional support given. Will monitor for educational needs.         Problem: Pain Management  Goal: Pain level will decrease to patient’s comfort goal  Outcome: PROGRESSING AS EXPECTED  Pt medicated with Tylenol PO PRN for c/o HA this afternoon.  Assessing every four hrs for pain per protocol; see doc flowsheets,          ASSESSMENT  74y F with recent history of ex lap, Lupe's procedure for perforated diverticulitis with sepsis.  She had multiple abdominal washouts and abdomen closed with strattice. She is otherwise hemodynamically stable and afebrile.     Plan:   - ID: Abx - Transition to Invanz through PICC  - LFD (consistent carb)  - Trend WBC  - Dispo: rehab     Green team  [id number]

## 2020-05-30 ENCOUNTER — APPOINTMENT (OUTPATIENT)
Dept: RADIOLOGY | Facility: MEDICAL CENTER | Age: 60
End: 2020-05-30
Attending: EMERGENCY MEDICINE
Payer: COMMERCIAL

## 2020-05-30 ENCOUNTER — HOSPITAL ENCOUNTER (OUTPATIENT)
Facility: MEDICAL CENTER | Age: 60
End: 2020-05-31
Attending: EMERGENCY MEDICINE | Admitting: FAMILY MEDICINE
Payer: COMMERCIAL

## 2020-05-30 DIAGNOSIS — R07.9 CHEST PAIN, UNSPECIFIED TYPE: ICD-10-CM

## 2020-05-30 PROBLEM — E78.5 DYSLIPIDEMIA: Status: ACTIVE | Noted: 2020-05-30

## 2020-05-30 PROBLEM — I25.10 CAD (CORONARY ARTERY DISEASE): Status: ACTIVE | Noted: 2020-05-30

## 2020-05-30 LAB
ALBUMIN SERPL BCP-MCNC: 4.2 G/DL (ref 3.2–4.9)
ALBUMIN/GLOB SERPL: 1.9 G/DL
ALP SERPL-CCNC: 81 U/L (ref 30–99)
ALT SERPL-CCNC: 28 U/L (ref 2–50)
ANION GAP SERPL CALC-SCNC: 13 MMOL/L (ref 7–16)
AST SERPL-CCNC: 20 U/L (ref 12–45)
BASOPHILS # BLD AUTO: 0.4 % (ref 0–1.8)
BASOPHILS # BLD: 0.03 K/UL (ref 0–0.12)
BILIRUB SERPL-MCNC: 0.6 MG/DL (ref 0.1–1.5)
BUN SERPL-MCNC: 21 MG/DL (ref 8–22)
CALCIUM SERPL-MCNC: 9.3 MG/DL (ref 8.5–10.5)
CHLORIDE SERPL-SCNC: 104 MMOL/L (ref 96–112)
CO2 SERPL-SCNC: 23 MMOL/L (ref 20–33)
CREAT SERPL-MCNC: 1.23 MG/DL (ref 0.5–1.4)
EKG IMPRESSION: NORMAL
EOSINOPHIL # BLD AUTO: 0.22 K/UL (ref 0–0.51)
EOSINOPHIL NFR BLD: 2.8 % (ref 0–6.9)
ERYTHROCYTE [DISTWIDTH] IN BLOOD BY AUTOMATED COUNT: 43 FL (ref 35.9–50)
GLOBULIN SER CALC-MCNC: 2.2 G/DL (ref 1.9–3.5)
GLUCOSE SERPL-MCNC: 111 MG/DL (ref 65–99)
HCT VFR BLD AUTO: 46.3 % (ref 42–52)
HGB BLD-MCNC: 15.5 G/DL (ref 14–18)
IMM GRANULOCYTES # BLD AUTO: 0.02 K/UL (ref 0–0.11)
IMM GRANULOCYTES NFR BLD AUTO: 0.3 % (ref 0–0.9)
LYMPHOCYTES # BLD AUTO: 2.31 K/UL (ref 1–4.8)
LYMPHOCYTES NFR BLD: 29.8 % (ref 22–41)
MCH RBC QN AUTO: 29.6 PG (ref 27–33)
MCHC RBC AUTO-ENTMCNC: 33.5 G/DL (ref 33.7–35.3)
MCV RBC AUTO: 88.5 FL (ref 81.4–97.8)
MONOCYTES # BLD AUTO: 0.59 K/UL (ref 0–0.85)
MONOCYTES NFR BLD AUTO: 7.6 % (ref 0–13.4)
NEUTROPHILS # BLD AUTO: 4.59 K/UL (ref 1.82–7.42)
NEUTROPHILS NFR BLD: 59.1 % (ref 44–72)
NRBC # BLD AUTO: 0 K/UL
NRBC BLD-RTO: 0 /100 WBC
PLATELET # BLD AUTO: 236 K/UL (ref 164–446)
PMV BLD AUTO: 9.2 FL (ref 9–12.9)
POTASSIUM SERPL-SCNC: 4 MMOL/L (ref 3.6–5.5)
PROT SERPL-MCNC: 6.4 G/DL (ref 6–8.2)
RBC # BLD AUTO: 5.23 M/UL (ref 4.7–6.1)
SODIUM SERPL-SCNC: 140 MMOL/L (ref 135–145)
TROPONIN T SERPL-MCNC: 14 NG/L (ref 6–19)
TROPONIN T SERPL-MCNC: 14 NG/L (ref 6–19)
TROPONIN T SERPL-MCNC: 15 NG/L (ref 6–19)
WBC # BLD AUTO: 7.8 K/UL (ref 4.8–10.8)

## 2020-05-30 PROCEDURE — A9270 NON-COVERED ITEM OR SERVICE: HCPCS | Performed by: EMERGENCY MEDICINE

## 2020-05-30 PROCEDURE — 96372 THER/PROPH/DIAG INJ SC/IM: CPT

## 2020-05-30 PROCEDURE — 71045 X-RAY EXAM CHEST 1 VIEW: CPT

## 2020-05-30 PROCEDURE — G0378 HOSPITAL OBSERVATION PER HR: HCPCS

## 2020-05-30 PROCEDURE — 93010 ELECTROCARDIOGRAM REPORT: CPT | Performed by: INTERNAL MEDICINE

## 2020-05-30 PROCEDURE — 80053 COMPREHEN METABOLIC PANEL: CPT

## 2020-05-30 PROCEDURE — 99220 PR INITIAL OBSERVATION CARE,LEVL III: CPT | Performed by: FAMILY MEDICINE

## 2020-05-30 PROCEDURE — 700102 HCHG RX REV CODE 250 W/ 637 OVERRIDE(OP): Performed by: EMERGENCY MEDICINE

## 2020-05-30 PROCEDURE — A9270 NON-COVERED ITEM OR SERVICE: HCPCS | Performed by: FAMILY MEDICINE

## 2020-05-30 PROCEDURE — 93005 ELECTROCARDIOGRAM TRACING: CPT | Performed by: FAMILY MEDICINE

## 2020-05-30 PROCEDURE — 93005 ELECTROCARDIOGRAM TRACING: CPT | Performed by: EMERGENCY MEDICINE

## 2020-05-30 PROCEDURE — 74175 CTA ABDOMEN W/CONTRAST: CPT

## 2020-05-30 PROCEDURE — 99285 EMERGENCY DEPT VISIT HI MDM: CPT

## 2020-05-30 PROCEDURE — 85025 COMPLETE CBC W/AUTO DIFF WBC: CPT

## 2020-05-30 PROCEDURE — 84484 ASSAY OF TROPONIN QUANT: CPT

## 2020-05-30 PROCEDURE — 700117 HCHG RX CONTRAST REV CODE 255: Performed by: EMERGENCY MEDICINE

## 2020-05-30 PROCEDURE — 700111 HCHG RX REV CODE 636 W/ 250 OVERRIDE (IP): Performed by: FAMILY MEDICINE

## 2020-05-30 PROCEDURE — 94760 N-INVAS EAR/PLS OXIMETRY 1: CPT

## 2020-05-30 PROCEDURE — 93005 ELECTROCARDIOGRAM TRACING: CPT

## 2020-05-30 PROCEDURE — 36415 COLL VENOUS BLD VENIPUNCTURE: CPT

## 2020-05-30 PROCEDURE — 700102 HCHG RX REV CODE 250 W/ 637 OVERRIDE(OP): Performed by: FAMILY MEDICINE

## 2020-05-30 RX ORDER — TAMSULOSIN HYDROCHLORIDE 0.4 MG/1
0.4 CAPSULE ORAL
COMMUNITY

## 2020-05-30 RX ORDER — HYDRALAZINE HYDROCHLORIDE 20 MG/ML
10 INJECTION INTRAMUSCULAR; INTRAVENOUS EVERY 6 HOURS PRN
Status: DISCONTINUED | OUTPATIENT
Start: 2020-05-30 | End: 2020-05-31 | Stop reason: HOSPADM

## 2020-05-30 RX ORDER — FINASTERIDE 5 MG/1
5 TABLET, FILM COATED ORAL DAILY
Status: DISCONTINUED | OUTPATIENT
Start: 2020-05-31 | End: 2020-05-31 | Stop reason: HOSPADM

## 2020-05-30 RX ORDER — PROMETHAZINE HYDROCHLORIDE 25 MG/1
12.5-25 TABLET ORAL EVERY 4 HOURS PRN
Status: DISCONTINUED | OUTPATIENT
Start: 2020-05-30 | End: 2020-05-31 | Stop reason: HOSPADM

## 2020-05-30 RX ORDER — BISACODYL 10 MG
10 SUPPOSITORY, RECTAL RECTAL
Status: DISCONTINUED | OUTPATIENT
Start: 2020-05-30 | End: 2020-05-31 | Stop reason: HOSPADM

## 2020-05-30 RX ORDER — LOSARTAN POTASSIUM 50 MG/1
100 TABLET ORAL
Status: DISCONTINUED | OUTPATIENT
Start: 2020-05-31 | End: 2020-05-31 | Stop reason: HOSPADM

## 2020-05-30 RX ORDER — ONDANSETRON 4 MG/1
4 TABLET, ORALLY DISINTEGRATING ORAL EVERY 4 HOURS PRN
Status: DISCONTINUED | OUTPATIENT
Start: 2020-05-30 | End: 2020-05-31 | Stop reason: HOSPADM

## 2020-05-30 RX ORDER — NITROGLYCERIN 0.4 MG/1
0.4 TABLET SUBLINGUAL
Status: DISCONTINUED | OUTPATIENT
Start: 2020-05-30 | End: 2020-05-31 | Stop reason: HOSPADM

## 2020-05-30 RX ORDER — ATORVASTATIN CALCIUM 20 MG/1
20 TABLET, FILM COATED ORAL NIGHTLY
COMMUNITY

## 2020-05-30 RX ORDER — ONDANSETRON 2 MG/ML
4 INJECTION INTRAMUSCULAR; INTRAVENOUS EVERY 4 HOURS PRN
Status: DISCONTINUED | OUTPATIENT
Start: 2020-05-30 | End: 2020-05-31 | Stop reason: HOSPADM

## 2020-05-30 RX ORDER — HYDROCHLOROTHIAZIDE 25 MG/1
12.5 TABLET ORAL
Status: DISCONTINUED | OUTPATIENT
Start: 2020-05-31 | End: 2020-05-31 | Stop reason: HOSPADM

## 2020-05-30 RX ORDER — LOSARTAN POTASSIUM AND HYDROCHLOROTHIAZIDE 12.5; 1 MG/1; MG/1
1 TABLET ORAL DAILY
Status: DISCONTINUED | OUTPATIENT
Start: 2020-05-30 | End: 2020-05-30

## 2020-05-30 RX ORDER — MORPHINE SULFATE 4 MG/ML
4 INJECTION, SOLUTION INTRAMUSCULAR; INTRAVENOUS
Status: DISCONTINUED | OUTPATIENT
Start: 2020-05-30 | End: 2020-05-31 | Stop reason: HOSPADM

## 2020-05-30 RX ORDER — OXYCODONE HYDROCHLORIDE 10 MG/1
10 TABLET ORAL
Status: DISCONTINUED | OUTPATIENT
Start: 2020-05-30 | End: 2020-05-31 | Stop reason: HOSPADM

## 2020-05-30 RX ORDER — ASPIRIN 325 MG
325 TABLET ORAL ONCE
Status: COMPLETED | OUTPATIENT
Start: 2020-05-30 | End: 2020-05-30

## 2020-05-30 RX ORDER — POLYETHYLENE GLYCOL 3350 17 G/17G
1 POWDER, FOR SOLUTION ORAL
Status: DISCONTINUED | OUTPATIENT
Start: 2020-05-30 | End: 2020-05-31 | Stop reason: HOSPADM

## 2020-05-30 RX ORDER — ISOSORBIDE MONONITRATE 30 MG/1
30 TABLET, EXTENDED RELEASE ORAL EVERY MORNING
COMMUNITY

## 2020-05-30 RX ORDER — AMINOPHYLLINE 25 MG/ML
100 INJECTION, SOLUTION INTRAVENOUS
Status: DISCONTINUED | OUTPATIENT
Start: 2020-05-30 | End: 2020-05-31 | Stop reason: HOSPADM

## 2020-05-30 RX ORDER — ACETAMINOPHEN 325 MG/1
650 TABLET ORAL EVERY 6 HOURS PRN
Status: DISCONTINUED | OUTPATIENT
Start: 2020-05-30 | End: 2020-05-31 | Stop reason: HOSPADM

## 2020-05-30 RX ORDER — REGADENOSON 0.08 MG/ML
0.4 INJECTION, SOLUTION INTRAVENOUS
Status: COMPLETED | OUTPATIENT
Start: 2020-05-30 | End: 2020-05-31

## 2020-05-30 RX ORDER — AMOXICILLIN 250 MG
2 CAPSULE ORAL 2 TIMES DAILY
Status: DISCONTINUED | OUTPATIENT
Start: 2020-05-30 | End: 2020-05-31 | Stop reason: HOSPADM

## 2020-05-30 RX ORDER — ISOSORBIDE MONONITRATE 30 MG/1
30 TABLET, EXTENDED RELEASE ORAL EVERY MORNING
Status: DISCONTINUED | OUTPATIENT
Start: 2020-05-31 | End: 2020-05-31 | Stop reason: HOSPADM

## 2020-05-30 RX ORDER — TAMSULOSIN HYDROCHLORIDE 0.4 MG/1
0.4 CAPSULE ORAL
Status: DISCONTINUED | OUTPATIENT
Start: 2020-05-31 | End: 2020-05-31 | Stop reason: HOSPADM

## 2020-05-30 RX ORDER — PROMETHAZINE HYDROCHLORIDE 25 MG/1
12.5-25 SUPPOSITORY RECTAL EVERY 4 HOURS PRN
Status: DISCONTINUED | OUTPATIENT
Start: 2020-05-30 | End: 2020-05-31 | Stop reason: HOSPADM

## 2020-05-30 RX ORDER — PROCHLORPERAZINE EDISYLATE 5 MG/ML
5-10 INJECTION INTRAMUSCULAR; INTRAVENOUS EVERY 4 HOURS PRN
Status: DISCONTINUED | OUTPATIENT
Start: 2020-05-30 | End: 2020-05-31 | Stop reason: HOSPADM

## 2020-05-30 RX ORDER — LOSARTAN POTASSIUM AND HYDROCHLOROTHIAZIDE 12.5; 1 MG/1; MG/1
1 TABLET ORAL DAILY
COMMUNITY

## 2020-05-30 RX ORDER — OXYCODONE HYDROCHLORIDE 5 MG/1
5 TABLET ORAL
Status: DISCONTINUED | OUTPATIENT
Start: 2020-05-30 | End: 2020-05-31 | Stop reason: HOSPADM

## 2020-05-30 RX ADMIN — ENOXAPARIN SODIUM 40 MG: 100 INJECTION SUBCUTANEOUS at 17:32

## 2020-05-30 RX ADMIN — METOPROLOL TARTRATE 25 MG: 25 TABLET, FILM COATED ORAL at 17:31

## 2020-05-30 RX ADMIN — ASPIRIN 325 MG: 325 TABLET, FILM COATED ORAL at 16:34

## 2020-05-30 RX ADMIN — NITROGLYCERIN 0.4 MG: 0.4 TABLET, ORALLY DISINTEGRATING SUBLINGUAL at 16:34

## 2020-05-30 RX ADMIN — IOHEXOL 100 ML: 350 INJECTION, SOLUTION INTRAVENOUS at 15:43

## 2020-05-30 RX ADMIN — DOCUSATE SODIUM 50 MG AND SENNOSIDES 8.6 MG 2 TABLET: 8.6; 5 TABLET, FILM COATED ORAL at 17:31

## 2020-05-30 ASSESSMENT — LIFESTYLE VARIABLES
HAVE YOU EVER FELT YOU SHOULD CUT DOWN ON YOUR DRINKING: NO
CONSUMPTION TOTAL: NEGATIVE
EVER FELT BAD OR GUILTY ABOUT YOUR DRINKING: NO
TOTAL SCORE: 0
EVER_SMOKED: NEVER
TOTAL SCORE: 0
DOES PATIENT WANT TO STOP DRINKING: NO
AVERAGE NUMBER OF DAYS PER WEEK YOU HAVE A DRINK CONTAINING ALCOHOL: 1
ON A TYPICAL DAY WHEN YOU DRINK ALCOHOL HOW MANY DRINKS DO YOU HAVE: 1
HAVE PEOPLE ANNOYED YOU BY CRITICIZING YOUR DRINKING: NO
TOTAL SCORE: 0
EVER HAD A DRINK FIRST THING IN THE MORNING TO STEADY YOUR NERVES TO GET RID OF A HANGOVER: NO
HOW MANY TIMES IN THE PAST YEAR HAVE YOU HAD 5 OR MORE DRINKS IN A DAY: 0
ALCOHOL_USE: YES
DO YOU DRINK ALCOHOL: NO

## 2020-05-30 ASSESSMENT — COGNITIVE AND FUNCTIONAL STATUS - GENERAL
MOBILITY SCORE: 23
SUGGESTED CMS G CODE MODIFIER DAILY ACTIVITY: CH
CLIMB 3 TO 5 STEPS WITH RAILING: A LITTLE
DAILY ACTIVITIY SCORE: 24
SUGGESTED CMS G CODE MODIFIER MOBILITY: CI

## 2020-05-30 ASSESSMENT — COPD QUESTIONNAIRES
DO YOU EVER COUGH UP ANY MUCUS OR PHLEGM?: NO/ONLY WITH OCCASIONAL COLDS OR INFECTIONS
HAVE YOU SMOKED AT LEAST 100 CIGARETTES IN YOUR ENTIRE LIFE: NO/DON'T KNOW
DURING THE PAST 4 WEEKS HOW MUCH DID YOU FEEL SHORT OF BREATH: SOME OF THE TIME
IN THE PAST 12 MONTHS DO YOU DO LESS THAN YOU USED TO BECAUSE OF YOUR BREATHING PROBLEMS: DISAGREE/UNSURE
COPD SCREENING SCORE: 2

## 2020-05-30 ASSESSMENT — ENCOUNTER SYMPTOMS
FEVER: 0
BLURRED VISION: 0
HEARTBURN: 0
SPEECH CHANGE: 0
VOMITING: 0
NECK PAIN: 0
DIZZINESS: 0
MYALGIAS: 0
NAUSEA: 0
COUGH: 0
WHEEZING: 0
SHORTNESS OF BREATH: 0
HEADACHES: 0
ABDOMINAL PAIN: 0
CHILLS: 0
FLANK PAIN: 0
FOCAL WEAKNESS: 0
DIARRHEA: 0
PALPITATIONS: 0
BACK PAIN: 0
DIAPHORESIS: 0
SORE THROAT: 0
WEAKNESS: 0
NERVOUS/ANXIOUS: 0
SENSORY CHANGE: 0

## 2020-05-30 ASSESSMENT — FIBROSIS 4 INDEX: FIB4 SCORE: 0.91

## 2020-05-30 ASSESSMENT — PATIENT HEALTH QUESTIONNAIRE - PHQ9
SUM OF ALL RESPONSES TO PHQ9 QUESTIONS 1 AND 2: 0
2. FEELING DOWN, DEPRESSED, IRRITABLE, OR HOPELESS: NOT AT ALL
1. LITTLE INTEREST OR PLEASURE IN DOING THINGS: NOT AT ALL

## 2020-05-30 NOTE — ED NOTES
Med rec complete per phone interview with family member. Allergies reviewed. No ABX taken in last 14 days.

## 2020-05-30 NOTE — ED TRIAGE NOTES
Ivan Ledezma 59 y.o. male ambulatory to triage for     Chief Complaint   Patient presents with   • Jaw Pain     onset this morning, L sided.  Pt felt he may have earache or tooth issue but pain started spreading to R side jaw and upper back.  Pt took 1 x NTG one hour ago and a 2nd NTG 5 minutes after.  Pt reports he feels much better.   • Back Pain     upper back     Pt reports cardiac stents x 2 placed approx 3 years ago with very similar symptoms.  Pt reports pain now 2/10 from initial 6/10 after NTG.  NAD.    /102   Pulse 86   Temp 36.8 °C (98.2 °F) (Temporal)   Resp 14   Ht 1.829 m (6')   Wt 125 kg (275 lb 9.2 oz)   SpO2 95%   BMI 37.37 kg/m²

## 2020-05-30 NOTE — ED PROVIDER NOTES
ED Provider Note    Scribed for Leatha Arzate M.D. by Kinza Correa. 5/30/2020  1:56 PM    Primary care provider: Pcp Not In Computer  Means of arrival: Walk-In  History obtained from: Patient  History limited by: None  CHIEF COMPLAINT  Chief Complaint   Patient presents with   • Jaw Pain     onset this morning, L sided.  Pt felt he may have earache or tooth issue but pain started spreading to R side jaw and upper back.  Pt took 1 x NTG one hour ago and a 2nd NTG 5 minutes after.  Pt reports he feels much better.   • Back Pain     upper back   • Hypertension       HPI  Ivan Ledemza is a 59 y.o. male who presents left sided jaw pain, neck pain, and pain between his shoulder blades onset this morning while walking through [employer]. Patient states he initially thought his pain was related to a tooth ache, however, it started to radiate across his jaw and into his upper back, which concerned him because the pain was reminiscent of the pain he had prior to stents placed for a non-STEMI several years ago. Chart review indicates patient was hospitalized here on 6/27/2017 for NSTEMI. 2 stents were placed at that time. Patient states his pain today feels similar to his previous episode. He reports taking 2 nitroglycerine tablets with moderate alleviation of symptoms. He states the pain has mostly resolved since onset, however, he still has an achy sensation in his jaw. He states he is currently being followed by Dr. Chappell (Cardiology) in [facility]. He denies any sore throat, rhinorrhea, cough, radiation of pain to arms or legs, nausea, vomiting, dizziness, lightheadedness, shortness of breath, or diaphoresis.     REVIEW OF SYSTEMS  Pertinent positives include: jaw pain and upper back pain.  Pertinent negatives include: sore throat, rhinorrhea, cough, radiation of pain to arms or legs, nausea, vomiting, dizziness, lightheadedness, shortness of breath, or diaphoresis.   See HPI for further details. All other  systems are negative.    PAST MEDICAL HISTORY  Past Medical History:   Diagnosis Date   • CAD (coronary artery disease)    • H/O heart artery stent    • Heart attack (HCC)    • Hypercholesteremia    • Hypertension        FAMILY HISTORY  Family History   Family history unknown: Yes       SOCIAL HISTORY  Social History     Socioeconomic History   • Marital status:      Spouse name: Not on file   • Number of children: Not on file   • Years of education: Not on file   • Highest education level: Not on file   Occupational History   • Not on file   Social Needs   • Financial resource strain: Not on file   • Food insecurity     Worry: Not on file     Inability: Not on file   • Transportation needs     Medical: Not on file     Non-medical: Not on file   Tobacco Use   • Smoking status: Never Smoker   • Smokeless tobacco: Never Used   Substance and Sexual Activity   • Alcohol use: Yes     Comment: seldom   • Drug use: No   • Sexual activity: Not on file   Lifestyle   • Physical activity     Days per week: Not on file     Minutes per session: Not on file   • Stress: Not on file   Relationships   • Social connections     Talks on phone: Not on file     Gets together: Not on file     Attends Synagogue service: Not on file     Active member of club or organization: Not on file     Attends meetings of clubs or organizations: Not on file     Relationship status: Not on file   • Intimate partner violence     Fear of current or ex partner: Not on file     Emotionally abused: Not on file     Physically abused: Not on file     Forced sexual activity: Not on file   Other Topics Concern   • Not on file   Social History Narrative   • Not on file       SURGICAL HISTORY  Past Surgical History:   Procedure Laterality Date   • OTHER CARDIAC SURGERY  06/2017    cardiac stent x 2       CURRENT MEDICATIONS  Home Medications     Reviewed by Vick Pereyra (Pharmacy Tech) on 05/30/20 at 1542  Med List Status: Complete   Medication  Last Dose Status   aspirin EC (ECOTRIN) 81 MG Tablet Delayed Response 5/30/2020 Active   atorvastatin (LIPITOR) 20 MG Tab UNK Active   FINASTERIDE PO 5/30/2020 Active   isosorbide mononitrate SR (IMDUR) 30 MG TABLET SR 24 HR 5/30/2020 Active   losartan-hydrochlorothiazide (HYZAAR) 100-12.5 MG per tablet 5/30/2020 Active   metoprolol (LOPRESSOR) 25 MG Tab 5/30/2020 Active   tamsulosin (FLOMAX) 0.4 MG capsule 5/30/2020 Active                ALLERGIES  Allergies   Allergen Reactions   • Motrin [Advil Cold-Sinus]        PHYSICAL EXAM  VITAL SIGNS: /102   Pulse 91   Temp 36.8 °C (98.2 °F) (Temporal)   Resp 16   Ht 1.829 m (6')   Wt 125 kg (275 lb 9.2 oz)   SpO2 97%   BMI 37.37 kg/m²      Constitutional: Well developed, well nourished; No acute distress; Non-toxic appearance.   HENT: Normocephalic, atraumatic; Bilateral external ears normal; Oropharynx with moist mucous membranes; No erythema or exudates in the posterior oropharynx.   Eyes: PERRL, EOMI, Conjunctiva normal. No discharge.   Neck:  Supple, nontender midline; No stridor; No nuchal rigidity.   Lymphatic: No cervical lymphadenopathy noted.   Cardiovascular: Regular rate and rhythm without murmurs, rubs, or gallop.   Thorax & Lungs: No respiratory distress, breath sounds clear to auscultation bilaterally without wheezing, rales or rhonchi. Nontender chest wall. No crepitus or subcutaneous air  Abdomen: Soft, nontender, bowel sounds normal. No obvious masses; No pulsatile masses; no rebound, guarding, or peritoneal signs.   Skin: Good color; warm and dry without rash or petechia.  Back: Nontender, No CVA tenderness.   Extremities: Distal radial, dorsalis pedis, posterior tibial pulses are equal bilaterally; No edema; Nontender calves or saphenous, No cyanosis, No clubbing.   Musculoskeletal: Good range of motion in all major joints. No tenderness to palpation or major deformities noted.   Neurologic: Alert & oriented x 4, clear speech    I verified  that the patient was wearing a mask and I was wearing appropriate PPE every time I entered the room. The patient's mask was on the patient at all times during my encounter except for a brief view of the oropharynx.     EKG  12 lead EKG performed by me as seen below.    LABS/RADIOLOGY/PROCEDURES  Results for orders placed or performed during the hospital encounter of 05/30/20   CBC with Differential   Result Value Ref Range    WBC 7.8 4.8 - 10.8 K/uL    RBC 5.23 4.70 - 6.10 M/uL    Hemoglobin 15.5 14.0 - 18.0 g/dL    Hematocrit 46.3 42.0 - 52.0 %    MCV 88.5 81.4 - 97.8 fL    MCH 29.6 27.0 - 33.0 pg    MCHC 33.5 (L) 33.7 - 35.3 g/dL    RDW 43.0 35.9 - 50.0 fL    Platelet Count 236 164 - 446 K/uL    MPV 9.2 9.0 - 12.9 fL    Neutrophils-Polys 59.10 44.00 - 72.00 %    Lymphocytes 29.80 22.00 - 41.00 %    Monocytes 7.60 0.00 - 13.40 %    Eosinophils 2.80 0.00 - 6.90 %    Basophils 0.40 0.00 - 1.80 %    Immature Granulocytes 0.30 0.00 - 0.90 %    Nucleated RBC 0.00 /100 WBC    Neutrophils (Absolute) 4.59 1.82 - 7.42 K/uL    Lymphs (Absolute) 2.31 1.00 - 4.80 K/uL    Monos (Absolute) 0.59 0.00 - 0.85 K/uL    Eos (Absolute) 0.22 0.00 - 0.51 K/uL    Baso (Absolute) 0.03 0.00 - 0.12 K/uL    Immature Granulocytes (abs) 0.02 0.00 - 0.11 K/uL    NRBC (Absolute) 0.00 K/uL   Complete Metabolic Panel (CMP)   Result Value Ref Range    Sodium 140 135 - 145 mmol/L    Potassium 4.0 3.6 - 5.5 mmol/L    Chloride 104 96 - 112 mmol/L    Co2 23 20 - 33 mmol/L    Anion Gap 13.0 7.0 - 16.0    Glucose 111 (H) 65 - 99 mg/dL    Bun 21 8 - 22 mg/dL    Creatinine 1.23 0.50 - 1.40 mg/dL    Calcium 9.3 8.5 - 10.5 mg/dL    AST(SGOT) 20 12 - 45 U/L    ALT(SGPT) 28 2 - 50 U/L    Alkaline Phosphatase 81 30 - 99 U/L    Total Bilirubin 0.6 0.1 - 1.5 mg/dL    Albumin 4.2 3.2 - 4.9 g/dL    Total Protein 6.4 6.0 - 8.2 g/dL    Globulin 2.2 1.9 - 3.5 g/dL    A-G Ratio 1.9 g/dL   Troponin   Result Value Ref Range    Troponin T 15 6 - 19 ng/L   ESTIMATED GFR    Result Value Ref Range    GFR If African American >60 >60 mL/min/1.73 m 2    GFR If Non African American >60 >60 mL/min/1.73 m 2   EKG (NOW)   Result Value Ref Range    Report       [facility] Emergency [facility]    Test Date:  2020  Pt Name:    MARY GIRON                 Department: ER  MRN:        [id number]                      Room:  Gender:     Male                         Technician: [id number]  :        [id number]                   Requested By:ER TRIAGE PROTOCOL  Order #:    [id number]                    Reading MD: Leatha Arzate    Measurements  Intervals                                Axis  Rate:       84                           P:          27  NJ:         192                          QRS:        -12  QRSD:       97                           T:          -2  QT:         362  QTc:        428    Interpretive Statements  Sinus rhythm rate of 84  Normal axis  Normal intervals  T wave inversion in lead III and T wave flattening in aVF  No ST elevation or depression  Compared to ECG 2017 13:44:32  Sinus bradycardia no longer present  First degree AV block no longer present  Electronically Signed On 2020 16:42:51 PDT by Leatha Arzate          CT-CTA COMPLETE THORACOABDOMINAL AORTA   Final Result      1.  Atherosclerotic disease with both calcific and low-density plaque. Resulting stenosis is less than 50%.      2.  1.4 cm hypodense mass on the lower pole of the right kidney is indeterminant. Follow-up nonemergent renal protocol MRI is recommended for further evaluation.      3.  Faint mosaic attenuation in the lungs suggestive of small airways disease.      4.  Diverticulosis            DX-CHEST-PORTABLE (1 VIEW)   Final Result      No acute cardiopulmonary findings.          COURSE & MEDICAL DECISION MAKING  Pertinent Labs & Imaging studies reviewed. (See chart for details)    Reviewed patient's old medical records which showed patient was hospitalized here on 2017 for NSTEMI. 2  stents were placed at that time.    1:56 PM - Patient seen and examined at bedside. Discussed plan of care, including the need for labs and radiology to rule out any emergent processes. Currently awaiting results before deciding if intervention is necessary. Patient agrees to the plan of care. The patient will be medicated with nitroglycerine tablet 0.4 mg. Ordered for labs and radiology to evaluate his symptoms.     1630: I discussed the case with Dr. Montes, hospitalist on-call.  He will kindly evaluate the patient hospitalization.  Given the patient's cardiac catheterization finding of a severe stenosis at the apical part of the distal LAD, he asked that I contact cardiology for guidance regarding whether or not the patient can be stressed or if the patient should just go to cardiac cath.    1640: Discussed the case with Dr. Kathleen, cardiologist on-call.  He is aware that patient had findings of severe stenosis at the apical part of the distal LAD and states that it is fine to go ahead and do serial cardiac enzymes on this patient and then perform some sort of stress test to further evaluate patient symptoms.    Patient presents to the ER with complaints of sudden onset of left jaw pain and neck pain and pain between his shoulder blades which are reminiscent of the pains he had 3 years ago prior to undergoing placement of 2 cardiac stents here at [facility] for ACS.  The patient states he was at [employer] when he suddenly noticed a discomfort in his left jaw.  That discomfort then spread into his right and left anterior neck and into his shoulder blades.  He felt short of breath.  No diaphoresis.  No dizziness or lightheadedness.  No nausea or vomiting.  Patient says the pain very much reminded him of the pain he had prior to his stent placement a few years ago.  This concerned him and he presented here to the ER.  Upon arrival his neck pain and the pain between his shoulder blades had  resolved.  He still has some discomfort in his jaw.  He does not have any gingival swelling.  No real dental pain with percussion of his teeth.  No facial swelling.  His TM is normal.  No evidence for otitis media.  Patient's EKG is nonacute and that there is no ST elevation or depression.  He has some T wave inversion in lead III but that is old compared to previous EKG.  Initial troponin is negative.  Chest x-ray is negative.  Patient underwent a CT scan of the chest abdomen pelvis to evaluate the aorta given his complaint of pain between his shoulder blades.  I wanted to be sure there was not any aortic dissection.  CT scan is negative for aortic dissection.  Patient is fairly comfortable here in the ER.  He says he has not been taking his Lipitor as he should.  He says it does not make him feel very good when he is on it so he takes it sporadically.  He had a cardiac catheterization done at [facility] 2 years ago.  It showed a severe stenosis of the apical distal LAD which was not amenable to intervention.  The cardiologist at [facility] recommended medical management at that time.  Additionally, patient has other coronary artery disease.  At this time given patient's risk factors and history, and the fact that the pain is very reminiscent of the pain he had prior to his stents, I think we need to hospitalize the patient overnight for further cardiac evaluation and management.  I spoke to the hospitalist on-call.  He recommended I contact the cardiologist given the patient's cardiac catheterization findings from [facility] 2 years ago.  Dr. Kathleen was consulted and I talked to him about the severe stenosis of the apical distal LAD.  He feels it is fine to go ahead and do serial cardiac enzymes on this patient and do some sort of stress test.  Patient is amenable to overnight hospitalization.  At this time further evaluation and management we done by Dr. Montes and the hospitalist team.    FINAL IMPRESSION  1. Chest pain,  unspecified type Acute      This dictation has been created using voice recognition software. The accuracy of the dictation is limited by the abilities of the software. I expect there may be some errors of grammar and possibly content. I made every attempt to manually correct the errors within my dictation. However, errors related to voice recognition software may still exist and should be interpreted within the appropriate context.     IKinza (Boom), am scribing for, and in the presence of, Leatha Arzate M.D..    Electronically signed by: Kinza Correa (Boom), 5/30/2020    Leatha JACKSON M.D. personally performed the services described in this documentation, as scribed by Kinza Correa in my presence, and it is both accurate and complete.    The note accurately reflects work and decisions made by me.  Leatha Arzate M.D.  5/30/2020  4:47 PM

## 2020-05-31 ENCOUNTER — APPOINTMENT (OUTPATIENT)
Dept: RADIOLOGY | Facility: MEDICAL CENTER | Age: 60
End: 2020-05-31
Attending: FAMILY MEDICINE
Payer: COMMERCIAL

## 2020-05-31 VITALS
SYSTOLIC BLOOD PRESSURE: 138 MMHG | DIASTOLIC BLOOD PRESSURE: 94 MMHG | OXYGEN SATURATION: 96 % | HEIGHT: 72 IN | BODY MASS INDEX: 37.39 KG/M2 | RESPIRATION RATE: 16 BRPM | WEIGHT: 276.02 LBS | HEART RATE: 67 BPM | TEMPERATURE: 97.6 F

## 2020-05-31 PROBLEM — R07.9 CHEST PAIN: Status: RESOLVED | Noted: 2020-05-30 | Resolved: 2020-05-31

## 2020-05-31 LAB
ANION GAP SERPL CALC-SCNC: 9 MMOL/L (ref 7–16)
BUN SERPL-MCNC: 19 MG/DL (ref 8–22)
CALCIUM SERPL-MCNC: 9.2 MG/DL (ref 8.5–10.5)
CHLORIDE SERPL-SCNC: 105 MMOL/L (ref 96–112)
CHOLEST SERPL-MCNC: 151 MG/DL (ref 100–199)
CO2 SERPL-SCNC: 25 MMOL/L (ref 20–33)
CREAT SERPL-MCNC: 1.29 MG/DL (ref 0.5–1.4)
EKG IMPRESSION: NORMAL
ERYTHROCYTE [DISTWIDTH] IN BLOOD BY AUTOMATED COUNT: 43.2 FL (ref 35.9–50)
GLUCOSE SERPL-MCNC: 88 MG/DL (ref 65–99)
HCT VFR BLD AUTO: 45.9 % (ref 42–52)
HDLC SERPL-MCNC: 28 MG/DL
HGB BLD-MCNC: 15.3 G/DL (ref 14–18)
LDLC SERPL CALC-MCNC: 63 MG/DL
MCH RBC QN AUTO: 29.7 PG (ref 27–33)
MCHC RBC AUTO-ENTMCNC: 33.3 G/DL (ref 33.7–35.3)
MCV RBC AUTO: 89.1 FL (ref 81.4–97.8)
PLATELET # BLD AUTO: 199 K/UL (ref 164–446)
PMV BLD AUTO: 9.3 FL (ref 9–12.9)
POTASSIUM SERPL-SCNC: 4 MMOL/L (ref 3.6–5.5)
RBC # BLD AUTO: 5.15 M/UL (ref 4.7–6.1)
SODIUM SERPL-SCNC: 139 MMOL/L (ref 135–145)
TRIGL SERPL-MCNC: 301 MG/DL (ref 0–149)
TROPONIN T SERPL-MCNC: 15 NG/L (ref 6–19)
WBC # BLD AUTO: 7.3 K/UL (ref 4.8–10.8)

## 2020-05-31 PROCEDURE — 700102 HCHG RX REV CODE 250 W/ 637 OVERRIDE(OP): Performed by: FAMILY MEDICINE

## 2020-05-31 PROCEDURE — A9270 NON-COVERED ITEM OR SERVICE: HCPCS | Performed by: FAMILY MEDICINE

## 2020-05-31 PROCEDURE — 700111 HCHG RX REV CODE 636 W/ 250 OVERRIDE (IP)

## 2020-05-31 PROCEDURE — G0378 HOSPITAL OBSERVATION PER HR: HCPCS

## 2020-05-31 PROCEDURE — 99217 PR OBSERVATION CARE DISCHARGE: CPT | Performed by: HOSPITALIST

## 2020-05-31 PROCEDURE — A9502 TC99M TETROFOSMIN: HCPCS

## 2020-05-31 PROCEDURE — 96374 THER/PROPH/DIAG INJ IV PUSH: CPT

## 2020-05-31 PROCEDURE — 36415 COLL VENOUS BLD VENIPUNCTURE: CPT

## 2020-05-31 PROCEDURE — 85027 COMPLETE CBC AUTOMATED: CPT

## 2020-05-31 PROCEDURE — 80061 LIPID PANEL: CPT

## 2020-05-31 PROCEDURE — 700111 HCHG RX REV CODE 636 W/ 250 OVERRIDE (IP): Performed by: FAMILY MEDICINE

## 2020-05-31 PROCEDURE — 80048 BASIC METABOLIC PNL TOTAL CA: CPT

## 2020-05-31 PROCEDURE — 96372 THER/PROPH/DIAG INJ SC/IM: CPT

## 2020-05-31 PROCEDURE — 84484 ASSAY OF TROPONIN QUANT: CPT

## 2020-05-31 RX ORDER — REGADENOSON 0.08 MG/ML
INJECTION, SOLUTION INTRAVENOUS
Status: COMPLETED
Start: 2020-05-31 | End: 2020-05-31

## 2020-05-31 RX ADMIN — REGADENOSON 0.4 MG: 0.08 INJECTION, SOLUTION INTRAVENOUS at 08:58

## 2020-05-31 RX ADMIN — ISOSORBIDE MONONITRATE 30 MG: 30 TABLET, EXTENDED RELEASE ORAL at 04:18

## 2020-05-31 RX ADMIN — HYDROCHLOROTHIAZIDE 12.5 MG: 25 TABLET ORAL at 04:18

## 2020-05-31 RX ADMIN — HYDRALAZINE HYDROCHLORIDE 10 MG: 20 INJECTION INTRAMUSCULAR; INTRAVENOUS at 05:47

## 2020-05-31 RX ADMIN — ASPIRIN 81 MG: 81 TABLET, COATED ORAL at 04:19

## 2020-05-31 RX ADMIN — METOPROLOL TARTRATE 25 MG: 25 TABLET, FILM COATED ORAL at 04:19

## 2020-05-31 RX ADMIN — LOSARTAN POTASSIUM 100 MG: 50 TABLET, FILM COATED ORAL at 04:18

## 2020-05-31 RX ADMIN — ENOXAPARIN SODIUM 40 MG: 100 INJECTION SUBCUTANEOUS at 04:17

## 2020-05-31 RX ADMIN — FINASTERIDE 5 MG: 5 TABLET, FILM COATED ORAL at 04:18

## 2020-05-31 RX ADMIN — TAMSULOSIN HYDROCHLORIDE 0.4 MG: 0.4 CAPSULE ORAL at 07:54

## 2020-05-31 RX ADMIN — DOCUSATE SODIUM 50 MG AND SENNOSIDES 8.6 MG 2 TABLET: 8.6; 5 TABLET, FILM COATED ORAL at 04:17

## 2020-05-31 NOTE — PROGRESS NOTES
Pt arrived to unit. Pt oriented to room, unit, and plan of care. Tele-monitor placed and monitor room notified. Provided education about stress test and NPO status at midnight, verbalizes understanding.  Patient currently A & O x 4; on room air; up independently; without complaints of acute pain. Call light within reach. Whiteboard updated. Fall precautions in place. Bed locked and in lowest position. All questions answered. No other needs indicated at this time.

## 2020-05-31 NOTE — DISCHARGE SUMMARY
Discharge Summary    CHIEF COMPLAINT ON ADMISSION  Chief Complaint   Patient presents with   • Jaw Pain     onset this morning, L sided.  Pt felt he may have earache or tooth issue but pain started spreading to R side jaw and upper back.  Pt took 1 x NTG one hour ago and a 2nd NTG 5 minutes after.  Pt reports he feels much better.   • Back Pain     upper back   • Hypertension       Reason for Admission  CHEST PAIN     Admission Date  5/30/2020    CODE STATUS  Full Code    HPI & HOSPITAL COURSE     Patient is a 59 y.o. male who presented 5/30/2020 with pain.  Patient stated he started having jaw pain on his left side this morning at first he thought it was a tooth infection.  However it persisted so he took a nitroglycerin and the jaw pain seemed to be a bit better.  He also started having some chest pain with radiation to his back.  He denies having any diaphoresis, shortness of breath, or palpitations.  He denies having any fever chills, cough or congestion, abdominal pain, nausea or vomiting.  He does have known history of myocardial infarction, CAD with stents, he had a cardiac catheterization done on 9/2018 which showed widely patent stents in mid LAD and continuing RCA, distal to PDA origin, borderline stenosis of mid RCA, not hemodynamically significant, severe stenosis of apical LAD. ER physician discussed these results with cardiology.  Serial enzymes and cardiac monitoring were negative and a cardiac stress test was also within normal limits.  Patient has had no further chest pain, no sob.  A CT of the chest was performed and there was no evidence of dissection or PE however an incidental 1.4 cm mass was noted in the right kidney.  This was discussed with patient who reported that he has also been followed for years for renal insufficiency.  An MRI is recommended and patient stated that since it is non emergent and he is eager for discharge that he will have this done as an outpatient at [facility], where he is  still insured.  This was discussed with nursing as well who will print a copy of the radiology report for patient to take to [facility].  He plans to follow up with his cardiologist at [place] as well.          Therefore, he is discharged in fair and stable condition to home with close outpatient follow-up.    The patient recovered much more quickly than anticipated on admission.    Discharge Date  5/31/20    FOLLOW UP ITEMS POST DISCHARGE  [facility]  Return to er if needed    DISCHARGE DIAGNOSES  Principal Problem (Resolved):    Chest pain POA: Yes  Active Problems:    Essential hypertension POA: Yes    CAD (coronary artery disease) POA: Yes    Dyslipidemia POA: Yes      FOLLOW UP  No future appointments.  [address] [address]  [phone]  In 1 month        MEDICATIONS ON DISCHARGE     Medication List      CONTINUE taking these medications      Instructions   aspirin EC 81 MG Tbec  Commonly known as:  ECOTRIN   Take 1 Tab by mouth every day.  Dose:  81 mg     atorvastatin 20 MG Tabs  Commonly known as:  LIPITOR   Take 20 mg by mouth every evening.  Dose:  20 mg     FINASTERIDE PO   Take 5 mg by mouth every day.  Dose:  5 mg     isosorbide mononitrate SR 30 MG Tb24  Commonly known as:  IMDUR   Take 30 mg by mouth every morning.  Dose:  30 mg     losartan-hydrochlorothiazide 100-12.5 MG per tablet  Commonly known as:  HYZAAR   Take 1 Tab by mouth every day.  Dose:  1 Tab     metoprolol 25 MG Tabs  Commonly known as:  LOPRESSOR   Take 1 Tab by mouth 2 times a day.  Dose:  25 mg     tamsulosin 0.4 MG capsule  Commonly known as:  FLOMAX   Take 0.4 mg by mouth ONE-HALF HOUR AFTER BREAKFAST.  Dose:  0.4 mg            Allergies  Allergies   Allergen Reactions   • Motrin [Advil Cold-Sinus]        DIET  Orders Placed This Encounter   Procedures   • Diet Order Cardiac     Standing Status:   Standing     Number of Occurrences:   1     Order Specific  Question:   Diet:     Answer:   Cardiac [6]     Order Specific Question:   Miscellaneous modifications:     Answer:   No Decaf, No Caffeine(for test) [11]       ACTIVITY  As tolerated.  Weight bearing as tolerated    CONSULTATIONS  cardiology    PROCEDURES  NM-CARDIAC STRESS TEST   Final Result      CT-CTA COMPLETE THORACOABDOMINAL AORTA   Final Result      1.  Atherosclerotic disease with both calcific and low-density plaque. Resulting stenosis is less than 50%.      2.  1.4 cm hypodense mass on the lower pole of the right kidney is indeterminant. Follow-up nonemergent renal protocol MRI is recommended for further evaluation.      3.  Faint mosaic attenuation in the lungs suggestive of small airways disease.      4.  Diverticulosis            DX-CHEST-PORTABLE (1 VIEW)   Final Result      No acute cardiopulmonary findings.            LABORATORY  Lab Results   Component Value Date    SODIUM 139 05/31/2020    POTASSIUM 4.0 05/31/2020    CHLORIDE 105 05/31/2020    CO2 25 05/31/2020    GLUCOSE 88 05/31/2020    BUN 19 05/31/2020    CREATININE 1.29 05/31/2020        Lab Results   Component Value Date    WBC 7.3 05/31/2020    HEMOGLOBIN 15.3 05/31/2020    HEMATOCRIT 45.9 05/31/2020    PLATELETCT 199 05/31/2020        Total time of the discharge process exceeds 30 minutes.

## 2020-05-31 NOTE — H&P
Hospital Medicine History & Physical Note    Date of Service  5/30/2020    Primary Care Physician  Raulito Jo M.D.    Code Status  Full Code    Chief Complaint  Chief Complaint   Patient presents with   • Jaw Pain     onset this morning, L sided.  Pt felt he may have earache or tooth issue but pain started spreading to R side jaw and upper back.  Pt took 1 x NTG one hour ago and a 2nd NTG 5 minutes after.  Pt reports he feels much better.   • Back Pain     upper back   • Hypertension       History of Presenting Illness  59 y.o. male who presented 5/30/2020 with pain.  Patient states he started having jaw pain on his left side this morning at first he thought it was a tooth infection.  However it persisted so he took a nitroglycerin and the jaw pain seemed to be a bit better.  He also started having some chest pain with radiation to his back.  He denies having any diaphoresis, shortness of breath, or palpitations.  He denies having any fever chills, cough or congestion, abdominal pain, nausea or vomiting.  He does have known history of myocardial infarction, CAD with stents, he had a cardiac catheterization done on 9/2018 which showed widely patent stents in mid LAD and continuing RCA, distal to PDA origin, borderline stenosis of mid RCA, not hemodynamically significant, severe stenosis of apical LAD. ER physician discussed these results with cardiology.  She states he has been compliant with taking medications except for Lipitor which causes him a lot of fatigue.  He states that they have tried to reduce his dose of Lipitor to the lowest dose however he continued to have fatigue.    Review of Systems  Review of Systems   Constitutional: Negative for chills, diaphoresis, fever and malaise/fatigue.   HENT: Negative for congestion, hearing loss and sore throat.    Eyes: Negative for blurred vision.   Respiratory: Negative for cough, shortness of breath and wheezing.    Cardiovascular: Positive for chest pain.  Negative for palpitations and leg swelling.   Gastrointestinal: Negative for abdominal pain, diarrhea, heartburn, nausea and vomiting.   Genitourinary: Negative for dysuria, flank pain and hematuria.   Musculoskeletal: Negative for back pain, joint pain, myalgias and neck pain.   Skin: Negative for rash.   Neurological: Negative for dizziness, sensory change, speech change, focal weakness, weakness and headaches.   Psychiatric/Behavioral: The patient is not nervous/anxious.        Past Medical History   has a past medical history of CAD (coronary artery disease), H/O heart artery stent, Heart attack (HCC), Hypercholesteremia, and Hypertension.    Surgical History   has a past surgical history that includes other cardiac surgery (06/2017).     Family History  Family history is unknown by patient.     Social History   reports that he has never smoked. He has never used smokeless tobacco. He reports current alcohol use. He reports that he does not use drugs.    Allergies  Allergies   Allergen Reactions   • Motrin [Advil Cold-Sinus]        Medications  Prior to Admission Medications   Prescriptions Last Dose Informant Patient Reported? Taking?   FINASTERIDE PO 5/30/2020 at Unknown time Family Member Yes Yes   Sig: Take 5 mg by mouth every day.   aspirin EC (ECOTRIN) 81 MG Tablet Delayed Response 5/30/2020 at Unknown time Family Member Yes No   Sig: Take 1 Tab by mouth every day.   atorvastatin (LIPITOR) 20 MG Tab UNK at UNK Family Member Yes Yes   Sig: Take 20 mg by mouth every evening.   isosorbide mononitrate SR (IMDUR) 30 MG TABLET SR 24 HR 5/30/2020 at Unknown time Family Member Yes Yes   Sig: Take 30 mg by mouth every morning.   losartan-hydrochlorothiazide (HYZAAR) 100-12.5 MG per tablet 5/30/2020 at Unknown time Family Member Yes Yes   Sig: Take 1 Tab by mouth every day.   metoprolol (LOPRESSOR) 25 MG Tab 5/30/2020 at Unknown time Family Member No No   Sig: Take 1 Tab by mouth 2 times a day.   tamsulosin (FLOMAX)  0.4 MG capsule 5/30/2020 at Unknown time Family Member Yes Yes   Sig: Take 0.4 mg by mouth ONE-HALF HOUR AFTER BREAKFAST.      Facility-Administered Medications: None       Physical Exam  Temp:  [36.8 °C (98.2 °F)-37.2 °C (99 °F)] 37.2 °C (99 °F)  Pulse:  [72-91] 72  Resp:  [14-20] 18  BP: (141-150)/() 141/99  SpO2:  [94 %-97 %] 96 %    Physical Exam  Vitals signs and nursing note reviewed.   Constitutional:       Appearance: He is obese.   HENT:      Head: Normocephalic and atraumatic.      Nose: No congestion.      Mouth/Throat:      Mouth: Mucous membranes are moist.   Eyes:      Conjunctiva/sclera: Conjunctivae normal.      Pupils: Pupils are equal, round, and reactive to light.   Neck:      Musculoskeletal: No muscular tenderness.   Cardiovascular:      Rate and Rhythm: Normal rate and regular rhythm.   Pulmonary:      Effort: Pulmonary effort is normal.      Breath sounds: Normal breath sounds.   Abdominal:      General: Bowel sounds are normal. There is no distension.      Palpations: Abdomen is soft.      Tenderness: There is no abdominal tenderness. There is no guarding or rebound.   Musculoskeletal:      Right lower leg: No edema.      Left lower leg: No edema.   Lymphadenopathy:      Cervical: No cervical adenopathy.   Skin:     General: Skin is warm and dry.   Neurological:      General: No focal deficit present.      Mental Status: He is alert and oriented to person, place, and time.      Cranial Nerves: No cranial nerve deficit.         Laboratory:  Recent Labs     05/30/20  1335   WBC 7.8   RBC 5.23   HEMOGLOBIN 15.5   HEMATOCRIT 46.3   MCV 88.5   MCH 29.6   MCHC 33.5*   RDW 43.0   PLATELETCT 236   MPV 9.2     Recent Labs     05/30/20  1335   SODIUM 140   POTASSIUM 4.0   CHLORIDE 104   CO2 23   GLUCOSE 111*   BUN 21   CREATININE 1.23   CALCIUM 9.3     Recent Labs     05/30/20  1335   ALTSGPT 28   ASTSGOT 20   ALKPHOSPHAT 81   TBILIRUBIN 0.6   GLUCOSE 111*         No results for input(s):  NTPROBNP in the last 72 hours.      Recent Labs     05/30/20  1335 05/30/20  1711   TROPONINT 15 14       Imaging:  CT-CTA COMPLETE THORACOABDOMINAL AORTA   Final Result      1.  Atherosclerotic disease with both calcific and low-density plaque. Resulting stenosis is less than 50%.      2.  1.4 cm hypodense mass on the lower pole of the right kidney is indeterminant. Follow-up nonemergent renal protocol MRI is recommended for further evaluation.      3.  Faint mosaic attenuation in the lungs suggestive of small airways disease.      4.  Diverticulosis            DX-CHEST-PORTABLE (1 VIEW)   Final Result      No acute cardiopulmonary findings.      NM-CARDIAC STRESS TEST    (Results Pending)         Assessment/Plan:    * Chest pain- (present on admission)  Assessment & Plan  ASA  Serial troponin  Check stress test    CAD (coronary artery disease)- (present on admission)  Assessment & Plan  Widely patent stents in mid LAD and continuing RCA, distal to PDA origin, borderline stenosis of mid RCA, not hemodynamically significant, severe stenosis of apical LAD. 9/18//2019  Continue ASA, Metoprolol    Essential hypertension- (present on admission)  Assessment & Plan  Continue Metoprolol, Losartan, HCTZ  IV hydralazine as needed with parameters    Dyslipidemia- (present on admission)  Assessment & Plan  Check lipid profile

## 2020-05-31 NOTE — PROGRESS NOTES
Patient is discharged in care of self. Alert and oriented x4. IV and tele box removed. Discharge instructions were provided and reviewed with the patient. Follow up appointments and how to obtain medical records were discussed. All questions and concerns addressed and patient verbalized understanding. Patient escorted off unit by this RN to be discharged home with his spouse.

## 2020-05-31 NOTE — DISCHARGE INSTRUCTIONS
Discharge Instructions    Discharged to home by car with relative. Discharged via walking, hospital escort: Yes.  Special equipment needed: Not Applicable    Be sure to schedule a follow-up appointment with your primary care doctor or any specialists as instructed.     Discharge Plan:   Diet Plan: Discussed  Activity Level: Discussed  Confirmed Follow up Appointment: Patient to Call and Schedule Appointment  Confirmed Symptoms Management: Discussed  Medication Reconciliation Updated: Yes    I understand that a diet low in cholesterol, fat, and sodium is recommended for good health. Unless I have been given specific instructions below for another diet, I accept this instruction as my diet prescription.   Other diet: Regular    Special Instructions: None    · Is patient discharged on Warfarin / Coumadin?   No     Depression / Suicide Risk    As you are discharged from this [facility] facility, it is important to learn how to keep safe from harming yourself.    Recognize the warning signs:  · Abrupt changes in personality, positive or negative- including increase in energy   · Giving away possessions  · Change in eating patterns- significant weight changes-  positive or negative  · Change in sleeping patterns- unable to sleep or sleeping all the time   · Unwillingness or inability to communicate  · Depression  · Unusual sadness, discouragement and loneliness  · Talk of wanting to die  · Neglect of personal appearance   · Rebelliousness- reckless behavior  · Withdrawal from people/activities they love  · Confusion- inability to concentrate     If you or a loved one observes any of these behaviors or has concerns about self-harm, here's what you can do:  · Talk about it- your feelings and reasons for harming yourself  · Remove any means that you might use to hurt yourself (examples: pills, rope, extension cords, firearm)  · Get professional help from the community (Mental Health, Substance Abuse, psychological  counseling)  · Do not be alone:Call your Safe Contact- someone whom you trust who will be there for you.  · Call your local CRISIS HOTLINE 613-6535 or 250-957-2994  · Call your local Children's Mobile Crisis Response Team [place] (600) 197-3842 or www.CPXi  · Call the toll free National Suicide Prevention Hotlines   · National Suicide Prevention Lifeline 033-941-PMOG (2813)  · [employer] 800-SUICIDE (167-3804)            Angina Pectoris  Angina pectoris is a very bad feeling in the chest, neck, or arm. Your doctor may call it angina. There are four types of angina. Angina is caused by a lack of blood in the middle and thickest layer of the heart wall (myocardium). Angina may feel like a crushing or squeezing pain in the chest. It may feel like tightness or heavy pressure in the chest. Some people say it feels like gas, heartburn, or indigestion. Some people have symptoms other than pain. These include:  · Shortness of breath.  · Cold sweats.  · Feeling sick to your stomach (nausea).  · Feeling light-headed.  Many women have chest discomfort and some of the other symptoms. However, women often have different symptoms, such as:  · Feeling tired (fatigue).  · Feeling nervous for no reason.  · Feeling weak for no reason.  · Dizziness or fainting.  Women may have angina without any symptoms.  Follow these instructions at home:  · Take medicines only as told by your doctor.  · Take care of other health issues as told by your doctor. These include:  ¨ High blood pressure (hypertension).  ¨ Diabetes.  · Follow a heart-healthy diet. Your doctor can help you to choose healthy food options and make changes.  · Talk to your doctor to learn more about healthy cooking methods and use them. These include:  ¨ Roasting.  ¨ Grilling.  ¨ Broiling.  ¨ Baking.  ¨ Poaching.  ¨ Steaming.  ¨ Stir-frying.  · Follow an exercise program approved by your doctor.  · Keep a healthy weight. Lose weight as told by  your doctor.  · Rest when you are tired.  · Learn to manage stress.  · Do not use any tobacco, such as cigarettes, chewing tobacco, or electronic cigarettes. If you need help quitting, ask your doctor.  · If you drink alcohol, and your doctor says it is okay, limit yourself to no more than 1 drink per day. One drink equals 12 ounces of beer, 5 ounces of wine, or 1½ ounces of hard liquor.  · Stop illegal drug use.  · Keep all follow-up visits as told by your doctor. This is important.  Do not take these medicines unless your doctor says that you can:  · Nonsteroidal anti-inflammatory drugs (NSAIDs). These include:  ¨ Ibuprofen.  ¨ Naproxen.  ¨ Celecoxib.  · Vitamin supplements that have vitamin A, vitamin E, or both.  · Hormone therapy that contains estrogen with or without progestin.  Get help right away if:  · You have pain in your chest, neck, arm, jaw, stomach, or back that:  ¨ Lasts more than a few minutes.  ¨ Comes back.  ¨ Does not get better after you take medicine under your tongue (sublingual nitroglycerin).  · You have any of these symptoms for no reason:  ¨ Gas, heartburn, or indigestion.  ¨ Sweating a lot.  ¨ Shortness of breath or trouble breathing.  ¨ Feeling sick to your stomach or throwing up.  ¨ Feeling more tired than usual.  ¨ Feeling nervous or worrying more than usual.  ¨ Feeling weak.  ¨ Diarrhea.  · You are suddenly dizzy or light-headed.  · You faint or pass out.  These symptoms may be an emergency. Do not wait to see if the symptoms will go away. Get medical help right away. Call your local emergency services (911 in the [place] Do not drive yourself to the hospital.   This information is not intended to replace advice given to you by your health care provider. Make sure you discuss any questions you have with your health care provider.  Document Released: 06/05/2009 Document Revised: 05/25/2017 Document Reviewed: 04/21/2015  Elsevier Interactive Patient Education © 2017 [employer]

## 2020-05-31 NOTE — PROGRESS NOTES
Assumed care of patient, bedside report received from SARITA Morejon. Updated on POC, call light within reach and fall precautions in place. Bed locked and in lowest position. Patient instructed to call for assistance before getting out of bed. All questions answered, no other needs at this time.

## 2020-05-31 NOTE — PROGRESS NOTES
VSS. Pt lying in bed resting. No signs of distress noted. PERRL, alert and oriented x4, speech clear, follows commands. Respirations equal and unlabored on RA. Denies pain or needs at this time. Bed locked, in the low position, call light within reach. Will continue to monitor

## 2020-05-31 NOTE — ASSESSMENT & PLAN NOTE
Widely patent stents in mid LAD and continuing RCA, distal to PDA origin, borderline stenosis of mid RCA, not hemodynamically significant, severe stenosis of apical LAD. 9/18//2019  Continue ASA, Metoprolol